# Patient Record
Sex: MALE | Race: BLACK OR AFRICAN AMERICAN | URBAN - METROPOLITAN AREA
[De-identification: names, ages, dates, MRNs, and addresses within clinical notes are randomized per-mention and may not be internally consistent; named-entity substitution may affect disease eponyms.]

---

## 2022-06-13 ENCOUNTER — OFFICE VISIT (OUTPATIENT)
Dept: FAMILY MEDICINE CLINIC | Age: 57
End: 2022-06-13
Payer: COMMERCIAL

## 2022-06-13 VITALS
TEMPERATURE: 99.6 F | HEART RATE: 76 BPM | SYSTOLIC BLOOD PRESSURE: 138 MMHG | HEIGHT: 66 IN | WEIGHT: 171 LBS | DIASTOLIC BLOOD PRESSURE: 80 MMHG | BODY MASS INDEX: 27.48 KG/M2 | OXYGEN SATURATION: 99 % | RESPIRATION RATE: 18 BRPM

## 2022-06-13 DIAGNOSIS — U07.1 SARS-COV-2 POSITIVE: ICD-10-CM

## 2022-06-13 DIAGNOSIS — B34.9 VIRAL ILLNESS: Primary | ICD-10-CM

## 2022-06-13 LAB
INFLUENZA A ANTIBODY: NEGATIVE
INFLUENZA B ANTIBODY: NEGATIVE
Lab: ABNORMAL
PERFORMING INSTRUMENT: ABNORMAL
QC PASS/FAIL: ABNORMAL
SARS-COV-2, POC: DETECTED

## 2022-06-13 PROCEDURE — 87426 SARSCOV CORONAVIRUS AG IA: CPT | Performed by: PHYSICIAN ASSISTANT

## 2022-06-13 PROCEDURE — 99213 OFFICE O/P EST LOW 20 MIN: CPT | Performed by: PHYSICIAN ASSISTANT

## 2022-06-13 PROCEDURE — 87804 INFLUENZA ASSAY W/OPTIC: CPT | Performed by: PHYSICIAN ASSISTANT

## 2022-06-13 RX ORDER — IBUPROFEN 800 MG/1
800 TABLET ORAL EVERY 8 HOURS PRN
Qty: 30 TABLET | Refills: 1 | Status: SHIPPED | OUTPATIENT
Start: 2022-06-13 | End: 2022-06-18

## 2022-06-13 SDOH — SOCIAL STABILITY: SOCIAL INSECURITY
WITHIN THE LAST YEAR, HAVE TO BEEN RAPED OR FORCED TO HAVE ANY KIND OF SEXUAL ACTIVITY BY YOUR PARTNER OR EX-PARTNER?: PATIENT DECLINED

## 2022-06-13 SDOH — HEALTH STABILITY: MENTAL HEALTH
STRESS IS WHEN SOMEONE FEELS TENSE, NERVOUS, ANXIOUS, OR CAN'T SLEEP AT NIGHT BECAUSE THEIR MIND IS TROUBLED. HOW STRESSED ARE YOU?: PATIENT DECLINED

## 2022-06-13 SDOH — SOCIAL STABILITY: SOCIAL NETWORK: HOW OFTEN DO YOU GET TOGETHER WITH FRIENDS OR RELATIVES?: PATIENT DECLINED

## 2022-06-13 SDOH — SOCIAL STABILITY: SOCIAL NETWORK: IN A TYPICAL WEEK, HOW MANY TIMES DO YOU TALK ON THE PHONE WITH FAMILY, FRIENDS, OR NEIGHBORS?: PATIENT DECLINED

## 2022-06-13 SDOH — SOCIAL STABILITY: SOCIAL NETWORK: HOW OFTEN DO YOU ATTENT MEETINGS OF THE CLUB OR ORGANIZATION YOU BELONG TO?: PATIENT DECLINED

## 2022-06-13 SDOH — SOCIAL STABILITY: SOCIAL INSECURITY
WITHIN THE LAST YEAR, HAVE YOU BEEN KICKED, HIT, SLAPPED, OR OTHERWISE PHYSICALLY HURT BY YOUR PARTNER OR EX-PARTNER?: PATIENT DECLINED

## 2022-06-13 SDOH — SOCIAL STABILITY: SOCIAL INSECURITY: WITHIN THE LAST YEAR, HAVE YOU BEEN AFRAID OF YOUR PARTNER OR EX-PARTNER?: PATIENT DECLINED

## 2022-06-13 SDOH — HEALTH STABILITY: PHYSICAL HEALTH
ON AVERAGE, HOW MANY DAYS PER WEEK DO YOU ENGAGE IN MODERATE TO STRENUOUS EXERCISE (LIKE A BRISK WALK)?: PATIENT DECLINED

## 2022-06-13 SDOH — ECONOMIC STABILITY: FOOD INSECURITY: WITHIN THE PAST 12 MONTHS, YOU WORRIED THAT YOUR FOOD WOULD RUN OUT BEFORE YOU GOT MONEY TO BUY MORE.: PATIENT DECLINED

## 2022-06-13 SDOH — HEALTH STABILITY: PHYSICAL HEALTH: ON AVERAGE, HOW MANY MINUTES DO YOU ENGAGE IN EXERCISE AT THIS LEVEL?: PATIENT DECLINED

## 2022-06-13 SDOH — SOCIAL STABILITY: SOCIAL NETWORK: HOW OFTEN DO YOU ATTEND CHURCH OR RELIGIOUS SERVICES?: PATIENT DECLINED

## 2022-06-13 SDOH — ECONOMIC STABILITY: TRANSPORTATION INSECURITY
IN THE PAST 12 MONTHS, HAS THE LACK OF TRANSPORTATION KEPT YOU FROM MEDICAL APPOINTMENTS OR FROM GETTING MEDICATIONS?: PATIENT DECLINED

## 2022-06-13 SDOH — SOCIAL STABILITY: SOCIAL NETWORK: ARE YOU MARRIED, WIDOWED, DIVORCED, SEPARATED, NEVER MARRIED, OR LIVING WITH A PARTNER?: PATIENT DECLINED

## 2022-06-13 SDOH — SOCIAL STABILITY: SOCIAL NETWORK
DO YOU BELONG TO ANY CLUBS OR ORGANIZATIONS SUCH AS CHURCH GROUPS UNIONS, FRATERNAL OR ATHLETIC GROUPS, OR SCHOOL GROUPS?: PATIENT DECLINED

## 2022-06-13 SDOH — SOCIAL STABILITY: SOCIAL INSECURITY
WITHIN THE LAST YEAR, HAVE YOU BEEN HUMILIATED OR EMOTIONALLY ABUSED IN OTHER WAYS BY YOUR PARTNER OR EX-PARTNER?: PATIENT DECLINED

## 2022-06-13 SDOH — ECONOMIC STABILITY: FOOD INSECURITY: WITHIN THE PAST 12 MONTHS, THE FOOD YOU BOUGHT JUST DIDN'T LAST AND YOU DIDN'T HAVE MONEY TO GET MORE.: PATIENT DECLINED

## 2022-06-13 SDOH — ECONOMIC STABILITY: INCOME INSECURITY: HOW HARD IS IT FOR YOU TO PAY FOR THE VERY BASICS LIKE FOOD, HOUSING, MEDICAL CARE, AND HEATING?: PATIENT DECLINED

## 2022-06-13 SDOH — ECONOMIC STABILITY: TRANSPORTATION INSECURITY
IN THE PAST 12 MONTHS, HAS LACK OF TRANSPORTATION KEPT YOU FROM MEETINGS, WORK, OR FROM GETTING THINGS NEEDED FOR DAILY LIVING?: PATIENT DECLINED

## 2022-06-13 SDOH — ECONOMIC STABILITY: INCOME INSECURITY: IN THE LAST 12 MONTHS, WAS THERE A TIME WHEN YOU WERE NOT ABLE TO PAY THE MORTGAGE OR RENT ON TIME?: PATIENT REFUSED

## 2022-06-13 SDOH — HEALTH STABILITY: MENTAL HEALTH: HOW OFTEN DO YOU HAVE A DRINK CONTAINING ALCOHOL?: PATIENT DECLINED

## 2022-06-13 SDOH — ECONOMIC STABILITY: HOUSING INSECURITY
IN THE LAST 12 MONTHS, WAS THERE A TIME WHEN YOU DID NOT HAVE A STEADY PLACE TO SLEEP OR SLEPT IN A SHELTER (INCLUDING NOW)?: PATIENT REFUSED

## 2022-06-13 SDOH — HEALTH STABILITY: MENTAL HEALTH: HOW MANY STANDARD DRINKS CONTAINING ALCOHOL DO YOU HAVE ON A TYPICAL DAY?: PATIENT DECLINED

## 2022-06-13 ASSESSMENT — PATIENT HEALTH QUESTIONNAIRE - PHQ9
SUM OF ALL RESPONSES TO PHQ9 QUESTIONS 1 & 2: 0
SUM OF ALL RESPONSES TO PHQ QUESTIONS 1-9: 0
1. LITTLE INTEREST OR PLEASURE IN DOING THINGS: 0
SUM OF ALL RESPONSES TO PHQ QUESTIONS 1-9: 0
2. FEELING DOWN, DEPRESSED OR HOPELESS: 0

## 2022-06-13 ASSESSMENT — ENCOUNTER SYMPTOMS
NAUSEA: 0
COUGH: 1
WHEEZING: 0
VOMITING: 0
CHEST TIGHTNESS: 0
SINUS PAIN: 0
BACK PAIN: 0
DIARRHEA: 0
ABDOMINAL PAIN: 1
SINUS PRESSURE: 0
SHORTNESS OF BREATH: 0
SORE THROAT: 1

## 2022-06-13 ASSESSMENT — VISUAL ACUITY: OU: 1

## 2022-06-13 NOTE — PROGRESS NOTES
[address] Encounter  CHIEF COMPLAINT       Chief Complaint   Patient presents with    Fever     patient started to feel sick about 2 days ago.  Headache    Generalized Body Aches    Pharyngitis       HISTORY OF PRESENT ILLNESS   Leti Marcelino is a 61 y.o. male who presents with:  Fever   This is a new problem. The current episode started yesterday. The problem occurs constantly. The problem has been gradually worsening. The maximum temperature noted was 100 to 100.9 F. The temperature was taken using an oral thermometer. Associated symptoms include abdominal pain, congestion, coughing, headaches, muscle aches, sleepiness and a sore throat. Pertinent negatives include no chest pain, diarrhea, ear pain, nausea, rash, urinary pain, vomiting or wheezing. He has tried acetaminophen and NSAIDs for the symptoms. The treatment provided mild relief. Risk factors: sick contacts    Risk factors: no contaminated food, no contaminated water, no hx of cancer, no immunosuppression, no occupational exposure, no recent sickness and no recent travel         REVIEW OF SYSTEMS     Review of Systems   Constitutional: Positive for fever. Negative for activity change, appetite change and chills. HENT: Positive for congestion and sore throat. Negative for drooling, ear pain, sinus pressure and sinus pain. Eyes: Negative for visual disturbance. Respiratory: Positive for cough. Negative for chest tightness, shortness of breath and wheezing. Cardiovascular: Negative for chest pain. Gastrointestinal: Positive for abdominal pain. Negative for diarrhea, nausea and vomiting. Endocrine: Negative for cold intolerance. Genitourinary: Negative for dysuria, flank pain, frequency and hematuria. Musculoskeletal: Negative for arthralgias and back pain. Skin: Negative for rash. Allergic/Immunologic: Negative for food allergies. Neurological: Positive for headaches.  Negative for weakness, light-headedness and numbness. Hematological: Does not bruise/bleed easily. PAST MEDICAL HISTORY         Diagnosis Date    Hyperlipidemia      SURGICAL HISTORY     Patient  has no past surgical history on file. CURRENT MEDICATIONS       Previous Medications    FLUTICASONE (FLONASE) 50 MCG/ACT NASAL SPRAY    USE 2 SPRAYS IN EACH NOSTRIL DAILY    TAMSULOSIN (FLOMAX) 0.4 MG CAPSULE    TAKE 1 CAPSULE BY MOUTH DAILY     ALLERGIES     Patient is is allergic to no known drug allergy. FAMILY HISTORY     Patient'sfamily history includes Alzheimer's Disease in his father; Asthma in his none; Cancer in his none; Cirrhosis in his mother; Coronary Art Dis in his maternal grandmother; Diabetes in his maternal grandmother; Hypertension in his father. SOCIAL HISTORY     Patient  reports that he has never smoked. He has never used smokeless tobacco. He reports that he does not use drugs. PHYSICAL EXAM     VITALS  BP: 138/80, Temp: 99.6 °F (37.6 °C), Heart Rate: 76, Resp: 18, SpO2: 99 %  Physical Exam  Vitals and nursing note reviewed. Constitutional:       General: He is awake. He is not in acute distress. Appearance: Normal appearance. He is well-developed. He is not ill-appearing, toxic-appearing or diaphoretic. HENT:      Head: Normocephalic and atraumatic. Right Ear: Hearing, tympanic membrane and external ear normal.      Left Ear: Hearing, tympanic membrane, ear canal and external ear normal.      Nose: Congestion present. Eyes:      General: Lids are normal. Vision grossly intact. Gaze aligned appropriately. Conjunctiva/sclera: Conjunctivae normal.   Cardiovascular:      Rate and Rhythm: Normal rate and regular rhythm. Pulses: Normal pulses. Heart sounds: Normal heart sounds, S1 normal and S2 normal.   Pulmonary:      Effort: Pulmonary effort is normal.      Breath sounds: Normal breath sounds and air entry. Musculoskeletal:      Cervical back: Normal range of motion. Skin:     General: Skin is warm. Capillary Refill: Capillary refill takes less than 2 seconds. Neurological:      Mental Status: He is alert and oriented to person, place, and time. Gait: Gait is intact. Psychiatric:         Attention and Perception: Attention normal.         Mood and Affect: Mood normal.         Speech: Speech normal.         Behavior: Behavior normal. Behavior is cooperative. READY CARE COURSE   Labs:  Results for POC orders placed in visit on 06/13/22   POCT Influenza A/B   Result Value Ref Range    Influenza A Ab negative     Influenza B Ab negative      IMAGING:  No orders to display     Scheduled Meds:  Continuous Infusions:  PRN Meds:. PROCEDURES:  FINAL IMPRESSION      1. Viral illness    2. SARS-CoV-2 positive      DISPOSITION/PLAN   1,2. POCT COVID-19 postive. Due to patient's age and symptoms recommend starting patient on Molnupiravir 800 mg BID for 5 days. Continue supportive treatment. Patient is to quarantine till 06/18/22 or may return to work on 06/23/22. Went over possible s/e of the medications. Patient would like to proceed with therapy. Discussed signs and symptoms which require immediate follow-up in ED/call to 911. Patient verbalized understanding. On this date 6/13/2022 I have spent 20 minutes reviewing previous notes, test results and face to face with the patient discussing the diagnosis and importance of compliance with the treatment plan as well as documenting on the day of the visit. PATIENT REFERRED TO:  Return if symptoms worsen or fail to improve. DISCHARGE MEDICATIONS:  New Prescriptions    IBUPROFEN (ADVIL;MOTRIN) 800 MG TABLET    Take 1 tablet by mouth every 8 hours as needed for Pain    MOLNUPIRAVIR 200 MG CAPSULE    Take 4 capsules by mouth every 12 hours for 5 days     Cannot display discharge medications since this is not an admission.        Neeraj Garrido

## 2022-06-13 NOTE — LETTER
[facility]  NOTIFICATION RETURN TO WORK / SCHOOL  [address] [facility] [address] [facility] [address]  Phone: [phone]  Fax: [phone]    June 13, 2022     Patient: Aditya Stovall   Date of Visit: 6/13/2022     To Whom it May Concern:    Aditya Stovall was tested for COVID-19 on 6/13/2022 and the result was positive. Patient's symptoms first started on June 12. Due to new CDC Guidelines, Patient will need to quarantine for 5 days from symptom onset. If the patient is asymptomatic or if symptoms are resolving and no fevers for 24 hours, without fever reducing medication such as Tylenol or Motrin, then patient is able to return to work on June 18 with strict mask policy for 5 additional days to minimize risk of infecting people they encounter. Otherwise, patient may return to work if fever free for 24 hours on on 6/23. Please refer to the [facility] website for further information or clarification. Component      Latest Ref Rng & Units 6/13/2022          10:06 AM   SARS-COV-2, POC      Not Detected Detected (A)   Lot Number       [id number]   QC Pass/Fail       pass   Performing Instrument       BD Veritor     If there are questions or concerns, please have the patient contact our office.     Sincerely,    Electronically signed by SAMI Lindquist on 6/13/2022 at 10:43 AM

## 2023-05-09 ENCOUNTER — OFFICE VISIT (OUTPATIENT)
Dept: FAMILY MEDICINE CLINIC | Age: 58
End: 2023-05-09
Payer: COMMERCIAL

## 2023-05-09 VITALS
OXYGEN SATURATION: 98 % | SYSTOLIC BLOOD PRESSURE: 120 MMHG | WEIGHT: 172 LBS | BODY MASS INDEX: 27.64 KG/M2 | HEART RATE: 86 BPM | HEIGHT: 66 IN | DIASTOLIC BLOOD PRESSURE: 88 MMHG

## 2023-05-09 DIAGNOSIS — K21.9 GASTROESOPHAGEAL REFLUX DISEASE, UNSPECIFIED WHETHER ESOPHAGITIS PRESENT: Primary | ICD-10-CM

## 2023-05-09 PROCEDURE — 99202 OFFICE O/P NEW SF 15 MIN: CPT | Performed by: NURSE PRACTITIONER

## 2023-05-09 RX ORDER — PANTOPRAZOLE SODIUM 40 MG/1
40 TABLET, DELAYED RELEASE ORAL
Qty: 30 TABLET | Refills: 0 | Status: SHIPPED | OUTPATIENT
Start: 2023-05-09

## 2023-05-09 ASSESSMENT — ENCOUNTER SYMPTOMS
ABDOMINAL PAIN: 1
NAUSEA: 1
COUGH: 0
EYE DISCHARGE: 0
EYE PAIN: 0
WHEEZING: 0
PHOTOPHOBIA: 0
VOMITING: 0
EYE ITCHING: 0
RHINORRHEA: 0
CONSTIPATION: 0
SHORTNESS OF BREATH: 0
EYE REDNESS: 0
CHEST TIGHTNESS: 0
DIARRHEA: 0
SORE THROAT: 0
SINUS PRESSURE: 0
TROUBLE SWALLOWING: 0
SINUS PAIN: 0

## 2023-05-09 ASSESSMENT — PATIENT HEALTH QUESTIONNAIRE - PHQ9
1. LITTLE INTEREST OR PLEASURE IN DOING THINGS: 0
SUM OF ALL RESPONSES TO PHQ9 QUESTIONS 1 & 2: 0
SUM OF ALL RESPONSES TO PHQ QUESTIONS 1-9: 0
2. FEELING DOWN, DEPRESSED OR HOPELESS: 0

## 2023-05-09 ASSESSMENT — VISUAL ACUITY: OU: 1

## 2023-05-09 NOTE — PROGRESS NOTES
Subjective:      Patient ID: Yamilet Locke is a 61 y.o. male who present today with:      Chief Complaint   Patient presents with    Nausea     Heart burn , stomach pain , headaches , fatigue, hard to sleep muscle pain, start 1 month      Symptoms for the last month  Workplace stress  He is retired but had to take another job to afford insurance for himself and his wife    Feels like a knot in his stomach  Heartburn  Headaches  Fatigue  Sleeping trouble  Body aches  He does not like to take medication if he can help it  Stomach problem not going away  Tried pepto bismol  No vomiting    Past Medical History:   Diagnosis Date    Hyperlipidemia      Patient Active Problem List    Diagnosis Date Noted    Hyperlipidemia 09/25/2013    Numbness and tingling sensation of skin 10/26/2009     No past surgical history on file.   Social History     Socioeconomic History    Marital status:      Spouse name: None    Number of children: None    Years of education: None    Highest education level: None   Tobacco Use    Smoking status: Never    Smokeless tobacco: Never    Tobacco comments:     Quit smoking: 10/12/1996   Substance and Sexual Activity    Drug use: No     Social Determinants of Health     Financial Resource Strain: Unknown    Difficulty of Paying Living Expenses: Patient refused   Food Insecurity: Unknown    Worried About Running Out of Food in the Last Year: Patient refused    Ran Out of Food in the Last Year: Patient refused   Transportation Needs: Unknown    Lack of Transportation (Medical): Patient refused    Lack of Transportation (Non-Medical): Patient refused   Physical Activity: Unknown    Days of Exercise per Week: Patient refused    Minutes of Exercise per Session: Patient refused   Stress: Unknown    Feeling of Stress : Patient refused   Social Connections: Unknown    Frequency of Communication with Friends and Family: Patient refused    Frequency of Social Gatherings with Friends and Family: Patient

## 2023-05-16 ENCOUNTER — OFFICE VISIT (OUTPATIENT)
Dept: FAMILY MEDICINE CLINIC | Age: 58
End: 2023-05-16
Payer: COMMERCIAL

## 2023-05-16 VITALS
DIASTOLIC BLOOD PRESSURE: 80 MMHG | HEIGHT: 66 IN | SYSTOLIC BLOOD PRESSURE: 134 MMHG | WEIGHT: 171 LBS | OXYGEN SATURATION: 97 % | HEART RATE: 65 BPM | TEMPERATURE: 97.8 F | BODY MASS INDEX: 27.48 KG/M2

## 2023-05-16 DIAGNOSIS — Z13.220 LIPID SCREENING: ICD-10-CM

## 2023-05-16 DIAGNOSIS — D17.1 LIPOMA OF TORSO: ICD-10-CM

## 2023-05-16 DIAGNOSIS — R10.9 ABDOMINAL PAIN, UNSPECIFIED ABDOMINAL LOCATION: ICD-10-CM

## 2023-05-16 DIAGNOSIS — Z12.11 COLON CANCER SCREENING: ICD-10-CM

## 2023-05-16 DIAGNOSIS — R10.9 ABDOMINAL PAIN, UNSPECIFIED ABDOMINAL LOCATION: Primary | ICD-10-CM

## 2023-05-16 LAB
ALBUMIN SERPL-MCNC: 4.3 G/DL (ref 3.5–4.6)
ALP SERPL-CCNC: 70 U/L (ref 35–104)
ALT SERPL-CCNC: 15 U/L (ref 0–41)
ANION GAP SERPL CALCULATED.3IONS-SCNC: 12 MEQ/L (ref 9–15)
AST SERPL-CCNC: 24 U/L (ref 0–40)
BASOPHILS # BLD: 0 K/UL (ref 0–0.2)
BASOPHILS NFR BLD: 0.8 %
BILIRUB SERPL-MCNC: <0.2 MG/DL (ref 0.2–0.7)
BUN SERPL-MCNC: 13 MG/DL (ref 6–20)
CALCIUM SERPL-MCNC: 9.6 MG/DL (ref 8.5–9.9)
CHLORIDE SERPL-SCNC: 103 MEQ/L (ref 95–107)
CHOLEST SERPL-MCNC: 298 MG/DL (ref 0–199)
CO2 SERPL-SCNC: 24 MEQ/L (ref 20–31)
CREAT SERPL-MCNC: 0.98 MG/DL (ref 0.7–1.2)
EOSINOPHIL # BLD: 0.1 K/UL (ref 0–0.7)
EOSINOPHIL NFR BLD: 1.4 %
ERYTHROCYTE [DISTWIDTH] IN BLOOD BY AUTOMATED COUNT: 13.5 % (ref 11.5–14.5)
GLOBULIN SER CALC-MCNC: 3.4 G/DL (ref 2.3–3.5)
GLUCOSE SERPL-MCNC: 104 MG/DL (ref 70–99)
HCT VFR BLD AUTO: 39.8 % (ref 42–52)
HDLC SERPL-MCNC: 59 MG/DL (ref 40–59)
HGB BLD-MCNC: 13.5 G/DL (ref 14–18)
LDLC SERPL CALC-MCNC: 224 MG/DL (ref 0–129)
LIPASE SERPL-CCNC: 147 U/L (ref 12–95)
LYMPHOCYTES # BLD: 2.8 K/UL (ref 1–4.8)
LYMPHOCYTES NFR BLD: 49 %
MCH RBC QN AUTO: 30.3 PG (ref 27–31.3)
MCHC RBC AUTO-ENTMCNC: 33.9 % (ref 33–37)
MCV RBC AUTO: 89.3 FL (ref 79–92.2)
MONOCYTES # BLD: 0.3 K/UL (ref 0.2–0.8)
MONOCYTES NFR BLD: 5.7 %
NEUTROPHILS # BLD: 2.4 K/UL (ref 1.4–6.5)
NEUTS SEG NFR BLD: 43.1 %
PLATELET # BLD AUTO: 355 K/UL (ref 130–400)
POTASSIUM SERPL-SCNC: 4.1 MEQ/L (ref 3.4–4.9)
PROT SERPL-MCNC: 7.7 G/DL (ref 6.3–8)
RBC # BLD AUTO: 4.46 M/UL (ref 4.7–6.1)
SODIUM SERPL-SCNC: 139 MEQ/L (ref 135–144)
TRIGL SERPL-MCNC: 77 MG/DL (ref 0–150)
WBC # BLD AUTO: 5.6 K/UL (ref 4.8–10.8)

## 2023-05-16 PROCEDURE — 99214 OFFICE O/P EST MOD 30 MIN: CPT | Performed by: NURSE PRACTITIONER

## 2023-05-16 ASSESSMENT — ENCOUNTER SYMPTOMS
SHORTNESS OF BREATH: 0
ABDOMINAL PAIN: 1
DIARRHEA: 1
COUGH: 0
CONSTIPATION: 0

## 2023-05-16 NOTE — PROGRESS NOTES
Standing Expiration Date:   5/15/2024    Lipase     Standing Status:   Future     Standing Expiration Date:   5/16/2024    Bebe Doyle MD, General Surgery, [facility]     Referral Priority:   Routine     Referral Type:   Eval and Treat     Referral Reason:   Specialty Services Required     Requested Specialty:   Surgical Critical Care     Number of Visits Requested:   1       No orders of the defined types were placed in this encounter. Medications Discontinued During This Encounter   Medication Reason    fluticasone (FLONASE) 50 MCG/ACT nasal spray ERROR    ibuprofen (ADVIL;MOTRIN) 800 MG tablet ERROR    tamsulosin (FLOMAX) 0.4 MG capsule ERROR      Side effects, adverse effects of the medication prescribed today, as well as treatment plan/ rationale and result expectations have been discussed with the patient who expresses understanding and desires to proceed. Close follow up to evaluate treatment results and for coordination of care. I have reviewed the patient's medical history in detail and updated the computerized patient record. As always, patient is advised that if symptoms worsen in any way they will proceed to the nearest emergency room.      FU 1-2 weeks       ALE Bansal - CNP

## 2023-05-17 ENCOUNTER — TELEPHONE (OUTPATIENT)
Dept: FAMILY MEDICINE CLINIC | Age: 58
End: 2023-05-17

## 2023-05-17 NOTE — TELEPHONE ENCOUNTER
PT calling requesting a full body CT scan , wants to get it done and over with instead of going multiple times due to the radiation

## 2023-05-18 DIAGNOSIS — R74.8 ELEVATED PANCREATIC ENZYME: ICD-10-CM

## 2023-05-18 DIAGNOSIS — R10.84 GENERALIZED ABDOMINAL PAIN: Primary | ICD-10-CM

## 2023-05-18 NOTE — TELEPHONE ENCOUNTER
Pt calling back asking for PET Scan .   Asking for this to be done instead of a CT     Pt [phone]

## 2023-05-19 NOTE — TELEPHONE ENCOUNTER
Spoke to patient   Aware sunil can not order the imaging his is requesting due to medical necessity. Will discuss further at follow up visit.

## 2023-05-22 NOTE — PROGRESS NOTES
GENERAL SURGERY  NEW PATIENT HISTORY AND PHYSICAL NOTE    Pt Name: Laura Anderson  MRN: [id number]    Date: 2023    Primary Care Physician: ALE Benjamin CNP  Referring Provider: Nancy Evans NP    Reason for evaluation: Right back mass      SUBJECTIVE:     History of Chief Complaint:    Edelmira Estrada is a 61 y.o. male with a PMH of HLD and GERD who presents with an upper right back mass. He reports having the mass since he was a kid. Notes the mass is getting bigger and causing some constant dull pain, 5/10. He does not take any pain meds for it. 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 Past Medical History:   Diagnosis Date    GERD (gastroesophageal reflux disease)     Hyperlipidemia      No past surgical history on file. Prior to Admission medications    Medication Sig Start Date End Date Taking?  Authorizing Provider   pantoprazole (PROTONIX) 40 MG tablet Take 1 tablet by mouth every morning (before breakfast) 23   ALE Rivas CNP     No Known Allergies  Family History   Problem Relation Age of Onset    Alzheimer's Disease Father     Hypertension Father             Cirrhosis Mother             Cancer None     Asthma None     Coronary Art Dis Maternal Grandmother     Diabetes Maternal Grandmother      Social History     Tobacco Use    Smoking status: Never    Smokeless tobacco: Never    Tobacco comments:     Quit smoking: 10/12/1996   Substance Use Topics    Drug use: No     Review of Systems:   General: Denies any fevers, chills, night sweats, and unintentional weight loss +appetite changes, fatigue  Skin: Denies any itching, rash, abscess +back bulges  HENT: Denies any blurry vision, sinus pressure, post nasal drip, rhinorrhea, sore throat, dysphagia +headaches  Resp: Denies any shortness of breath, dyspnea on exertion, coughing, wheezing  Cardiac: Denies any chest pain, palpitations, lower extremity edema  GI: Denies any vomiting,

## 2023-05-23 ENCOUNTER — OFFICE VISIT (OUTPATIENT)
Dept: FAMILY MEDICINE CLINIC | Age: 58
End: 2023-05-23
Payer: COMMERCIAL

## 2023-05-23 ENCOUNTER — PREP FOR PROCEDURE (OUTPATIENT)
Dept: SURGERY | Age: 58
End: 2023-05-23

## 2023-05-23 ENCOUNTER — OFFICE VISIT (OUTPATIENT)
Dept: SURGERY | Age: 58
End: 2023-05-23
Payer: COMMERCIAL

## 2023-05-23 VITALS
BODY MASS INDEX: 27.32 KG/M2 | WEIGHT: 170 LBS | HEART RATE: 56 BPM | SYSTOLIC BLOOD PRESSURE: 110 MMHG | OXYGEN SATURATION: 97 % | HEIGHT: 66 IN | DIASTOLIC BLOOD PRESSURE: 70 MMHG

## 2023-05-23 VITALS
HEART RATE: 64 BPM | DIASTOLIC BLOOD PRESSURE: 74 MMHG | HEIGHT: 66 IN | OXYGEN SATURATION: 99 % | SYSTOLIC BLOOD PRESSURE: 118 MMHG | WEIGHT: 173 LBS | BODY MASS INDEX: 27.8 KG/M2 | TEMPERATURE: 98.2 F

## 2023-05-23 DIAGNOSIS — R10.9 ABDOMINAL PAIN, UNSPECIFIED ABDOMINAL LOCATION: ICD-10-CM

## 2023-05-23 DIAGNOSIS — D64.9 ANEMIA, UNSPECIFIED TYPE: ICD-10-CM

## 2023-05-23 DIAGNOSIS — R22.2 MASS ON BACK: Primary | ICD-10-CM

## 2023-05-23 DIAGNOSIS — R74.8 ELEVATED LIPASE: Primary | ICD-10-CM

## 2023-05-23 DIAGNOSIS — E78.5 HYPERLIPIDEMIA, UNSPECIFIED HYPERLIPIDEMIA TYPE: Chronic | ICD-10-CM

## 2023-05-23 PROCEDURE — 99203 OFFICE O/P NEW LOW 30 MIN: CPT | Performed by: SURGERY

## 2023-05-23 PROCEDURE — 99213 OFFICE O/P EST LOW 20 MIN: CPT | Performed by: NURSE PRACTITIONER

## 2023-05-23 RX ORDER — SODIUM CHLORIDE 0.9 % (FLUSH) 0.9 %
5-40 SYRINGE (ML) INJECTION PRN
OUTPATIENT
Start: 2023-05-23

## 2023-05-23 RX ORDER — SODIUM CHLORIDE 9 MG/ML
INJECTION, SOLUTION INTRAVENOUS PRN
OUTPATIENT
Start: 2023-05-23

## 2023-05-23 RX ORDER — SODIUM CHLORIDE 0.9 % (FLUSH) 0.9 %
5-40 SYRINGE (ML) INJECTION EVERY 12 HOURS SCHEDULED
OUTPATIENT
Start: 2023-05-23

## 2023-05-23 ASSESSMENT — ENCOUNTER SYMPTOMS
ABDOMINAL PAIN: 1
SHORTNESS OF BREATH: 0
COUGH: 0
ABDOMINAL DISTENTION: 1

## 2023-05-23 NOTE — PROGRESS NOTES
Subjective  Chief Complaint   Patient presents with    Abdominal Pain     1 week follow up          HPI    Pt is here for fu of recent BW.   Mild anemia, elevated lipase and elevated cholesterol found. CT Of abdomen ordered. Not done yet. Declining cholesterol med. Wants \"full body scan/PET scan\"     Lab Results   Component Value Date    WBC 5.6 2023    HGB 13.5 (L) 2023    HCT 39.8 (L) 2023     2023    CHOL 298 (H) 2023    TRIG 77 2023    HDL 59 2023    LDLDIRECT 180 (H) 2013    ALT 15 2023    AST 24 2023     2023    K 4.1 2023     2023    CREATININE 0.98 2023    BUN 13 2023    CO2 24 2023    PSA 0.56 2015       Patient Active Problem List    Diagnosis Date Noted    Mass of skin of back 2023    Hyperlipidemia 2013    Numbness and tingling sensation of skin 10/26/2009     Past Medical History:   Diagnosis Date    GERD (gastroesophageal reflux disease)     Hyperlipidemia      No past surgical history on file.   Family History   Problem Relation Age of Onset    Alzheimer's Disease Father     Hypertension Father             Cirrhosis Mother             Cancer None     Asthma None     Coronary Art Dis Maternal Grandmother     Diabetes Maternal Grandmother      Social History     Socioeconomic History    Marital status:      Spouse name: None    Number of children: None    Years of education: None    Highest education level: None   Tobacco Use    Smoking status: Never    Smokeless tobacco: Never    Tobacco comments:     Quit smoking: 10/12/1996   Substance and Sexual Activity    Drug use: No     Social Determinants of Health     Financial Resource Strain: Unknown    Difficulty of Paying Living Expenses: Patient refused   Food Insecurity: Unknown    Worried About Running Out of Food in the Last Year: Patient refused    [address] in the Last Year: Patient refused

## 2023-05-24 LAB
FOLATE: 8.2 NG/ML
IRON SATURATION: 19 % (ref 20–55)
IRON: 52 UG/DL (ref 59–158)
TOTAL IRON BINDING CAPACITY: 280 UG/DL (ref 250–450)
UNSATURATED IRON BINDING CAPACITY: 228 UG/DL (ref 112–347)
VITAMIN B-12: 715 PG/ML (ref 232–1245)

## 2023-06-08 ENCOUNTER — HOSPITAL ENCOUNTER (OUTPATIENT)
Dept: CT IMAGING | Age: 58
Discharge: HOME | End: 2023-06-10
Payer: COMMERCIAL

## 2023-06-08 DIAGNOSIS — R10.84 GENERALIZED ABDOMINAL PAIN: ICD-10-CM

## 2023-06-08 DIAGNOSIS — R74.8 ELEVATED PANCREATIC ENZYME: ICD-10-CM

## 2023-06-08 PROCEDURE — 6360000004 HC RX CONTRAST MEDICATION: Performed by: NURSE PRACTITIONER

## 2023-06-08 PROCEDURE — 74177 CT ABD & PELVIS W/CONTRAST: CPT

## 2023-06-08 RX ADMIN — IOPAMIDOL 50 ML: 612 INJECTION, SOLUTION INTRAVENOUS at 11:17

## 2023-06-23 ENCOUNTER — OFFICE VISIT (OUTPATIENT)
Dept: SURGERY | Age: 58
End: 2023-06-23

## 2023-06-23 VITALS
BODY MASS INDEX: 27.71 KG/M2 | DIASTOLIC BLOOD PRESSURE: 80 MMHG | TEMPERATURE: 97.1 F | HEIGHT: 66 IN | WEIGHT: 172.4 LBS | HEART RATE: 78 BPM | SYSTOLIC BLOOD PRESSURE: 128 MMHG | OXYGEN SATURATION: 98 %

## 2023-06-23 DIAGNOSIS — Z09 POSTOP CHECK: Primary | ICD-10-CM

## 2023-06-23 PROCEDURE — 99024 POSTOP FOLLOW-UP VISIT: CPT | Performed by: SURGERY

## 2023-07-14 LAB — NONINV COLON CA DNA+OCC BLD SCRN STL QL: NORMAL

## 2023-08-08 ENCOUNTER — OFFICE VISIT (OUTPATIENT)
Dept: FAMILY MEDICINE CLINIC | Age: 58
End: 2023-08-08
Payer: COMMERCIAL

## 2023-08-08 VITALS
OXYGEN SATURATION: 97 % | WEIGHT: 169 LBS | HEIGHT: 66 IN | SYSTOLIC BLOOD PRESSURE: 140 MMHG | HEART RATE: 60 BPM | DIASTOLIC BLOOD PRESSURE: 80 MMHG | BODY MASS INDEX: 27.16 KG/M2 | TEMPERATURE: 97.9 F

## 2023-08-08 DIAGNOSIS — R09.81 NASAL CONGESTION: ICD-10-CM

## 2023-08-08 DIAGNOSIS — J02.9 SORE THROAT: ICD-10-CM

## 2023-08-08 DIAGNOSIS — J06.9 VIRAL URI: ICD-10-CM

## 2023-08-08 DIAGNOSIS — R19.7 DIARRHEA, UNSPECIFIED TYPE: Primary | ICD-10-CM

## 2023-08-08 LAB
INFLUENZA A ANTIBODY: NORMAL
INFLUENZA B ANTIBODY: NORMAL
Lab: NORMAL
PERFORMING INSTRUMENT: NORMAL
QC PASS/FAIL: NORMAL
SARS-COV-2, POC: NORMAL

## 2023-08-08 PROCEDURE — 99214 OFFICE O/P EST MOD 30 MIN: CPT | Performed by: NURSE PRACTITIONER

## 2023-08-08 PROCEDURE — 87426 SARSCOV CORONAVIRUS AG IA: CPT | Performed by: NURSE PRACTITIONER

## 2023-08-08 PROCEDURE — 87804 INFLUENZA ASSAY W/OPTIC: CPT | Performed by: NURSE PRACTITIONER

## 2023-08-08 RX ORDER — PENICILLIN V POTASSIUM 500 MG/1
500 TABLET ORAL 3 TIMES DAILY
Qty: 30 TABLET | Refills: 0 | Status: SHIPPED | OUTPATIENT
Start: 2023-08-08 | End: 2023-08-18

## 2023-08-08 ASSESSMENT — ENCOUNTER SYMPTOMS
DIARRHEA: 1
VOMITING: 0
SORE THROAT: 0
COUGH: 0
NAUSEA: 0
SHORTNESS OF BREATH: 0
ABDOMINAL PAIN: 0
RHINORRHEA: 1
WHEEZING: 0
BLOOD IN STOOL: 0

## 2023-08-08 NOTE — PROGRESS NOTES
Subjective:      Patient ID: Erika Finn is a 60 y.o. male who presents today for:  Chief Complaint   Patient presents with    Diarrhea     With fatigue headache loss of appetite body aches pains nasal drainage , all SX x  2-7 days           Diarrhea   Pertinent negatives include no abdominal pain, chills, coughing, fever, headaches, myalgias or vomiting.          Got sick about 7 days  This was mainly fatigue loss of appetite and diarrhea    He is improving now  Diarrhea 3-5 per day   So far once today   Some Heartburn, took something OTC and that helped   The congestion/runny nose is just starting   Appetite improving   Was never throwing up   Coughing last night but hasnt all week   Hes been able to go to work   Throat is kind of raw   Usually he takes PCN when  hes not feeling well but he doesn't have any of that and he needs some   No abd pain   No blood in the stool  Just watery   Past Medical History:   Diagnosis Date    GERD (gastroesophageal reflux disease)     Hyperlipidemia      Past Surgical History:   Procedure Laterality Date    SKIN LESION EXCISION Right 6/13/2023    EXCISION OF BACK MASS performed by Lindsay Kerr MD at [address] [place] [address] History     Socioeconomic History    Marital status:      Spouse name: Not on file    Number of children: Not on file    Years of education: Not on file    Highest education level: Not on file   Occupational History    Not on file   Tobacco Use    Smoking status: Never    Smokeless tobacco: Never    Tobacco comments:     Quit smoking: 10/12/1996   Vaping Use    Vaping Use: Never used   Substance and Sexual Activity    Alcohol use: Not Currently    Drug use: No    Sexual activity: Not on file   Other Topics Concern    Not on file   Social History Narrative    Not on file     Social Determinants of Health     Financial Resource Strain: Not on file   Food Insecurity: Not on file   Transportation Needs: Not on file   Physical Activity: Not on file   Stress:

## 2024-05-21 ENCOUNTER — TELEPHONE (OUTPATIENT)
Dept: FAMILY MEDICINE CLINIC | Age: 59
End: 2024-05-21

## 2024-05-21 NOTE — TELEPHONE ENCOUNTER
Pt calling asking about the bill of dos 8/8/23 for  $33.33. pt advised of what the eob from insurance company, pt is asking why the [employer] company did not pay all of the covid testing , advised that he would have to contact his insurance company. Also advised that the pt is being billed for two flu test a+B two different test. Pt is aware of all of this.

## 2024-06-15 ENCOUNTER — HOSPITAL ENCOUNTER (EMERGENCY)
Age: 59
Discharge: HOME | End: 2024-06-15
Payer: COMMERCIAL

## 2024-06-15 ENCOUNTER — APPOINTMENT (OUTPATIENT)
Dept: GENERAL RADIOLOGY | Age: 59
End: 2024-06-15
Payer: COMMERCIAL

## 2024-06-15 VITALS
TEMPERATURE: 97.5 F | HEART RATE: 63 BPM | BODY MASS INDEX: 28.12 KG/M2 | DIASTOLIC BLOOD PRESSURE: 93 MMHG | WEIGHT: 175 LBS | SYSTOLIC BLOOD PRESSURE: 167 MMHG | OXYGEN SATURATION: 99 % | RESPIRATION RATE: 18 BRPM | HEIGHT: 66 IN

## 2024-06-15 DIAGNOSIS — S46.912A STRAIN OF LEFT SHOULDER, INITIAL ENCOUNTER: Primary | ICD-10-CM

## 2024-06-15 PROCEDURE — 99283 EMERGENCY DEPT VISIT LOW MDM: CPT

## 2024-06-15 PROCEDURE — 73030 X-RAY EXAM OF SHOULDER: CPT

## 2024-06-15 PROCEDURE — 6370000000 HC RX 637 (ALT 250 FOR IP): Performed by: PERSONAL EMERGENCY RESPONSE ATTENDANT

## 2024-06-15 RX ORDER — IBUPROFEN 800 MG/1
800 TABLET ORAL ONCE
Status: COMPLETED | OUTPATIENT
Start: 2024-06-15 | End: 2024-06-15

## 2024-06-15 RX ORDER — METHOCARBAMOL 750 MG/1
750 TABLET, FILM COATED ORAL 4 TIMES DAILY
Qty: 20 TABLET | Refills: 0 | Status: SHIPPED | OUTPATIENT
Start: 2024-06-15 | End: 2024-06-20

## 2024-06-15 RX ADMIN — IBUPROFEN 800 MG: 800 TABLET, FILM COATED ORAL at 23:11

## 2024-06-15 ASSESSMENT — PAIN DESCRIPTION - DESCRIPTORS: DESCRIPTORS: THROBBING

## 2024-06-15 ASSESSMENT — PAIN DESCRIPTION - PAIN TYPE: TYPE: ACUTE PAIN

## 2024-06-15 ASSESSMENT — ENCOUNTER SYMPTOMS
COUGH: 0
VOMITING: 0
COLOR CHANGE: 0
BLOOD IN STOOL: 0
SORE THROAT: 0
SHORTNESS OF BREATH: 0
ABDOMINAL PAIN: 0
RHINORRHEA: 0
NAUSEA: 0
DIARRHEA: 0

## 2024-06-15 ASSESSMENT — PAIN SCALES - GENERAL
PAINLEVEL_OUTOF10: 8
PAINLEVEL_OUTOF10: 8

## 2024-06-15 ASSESSMENT — PAIN - FUNCTIONAL ASSESSMENT: PAIN_FUNCTIONAL_ASSESSMENT: 0-10

## 2024-06-15 ASSESSMENT — LIFESTYLE VARIABLES
HOW OFTEN DO YOU HAVE A DRINK CONTAINING ALCOHOL: NEVER
HOW MANY STANDARD DRINKS CONTAINING ALCOHOL DO YOU HAVE ON A TYPICAL DAY: PATIENT DOES NOT DRINK

## 2024-06-15 ASSESSMENT — PAIN DESCRIPTION - ORIENTATION: ORIENTATION: LEFT

## 2024-06-15 ASSESSMENT — PAIN DESCRIPTION - LOCATION
LOCATION: SHOULDER;NECK
LOCATION: SHOULDER

## 2024-06-16 NOTE — ED PROVIDER NOTES
Behavior normal.         Thought Content: Thought content normal.         Judgment: Judgment normal.         DIAGNOSTIC RESULTS     EKG:All EKG's are interpreted by the Emergency Department Physician who either signs or Co-signs this chart in the absence of a cardiologist.        RADIOLOGY:   Non-plain film images such as CT, Ultrasound and MRI are read by theradiologist. Plain radiographic images are visualized and preliminarily interpreted by the emergency physician with the below findings:    Interpretation per theRadiologist below, if available at the time of this note:    XR SHOULDER LEFT (MIN 2 VIEWS)    (Results Pending)           LABS:  Labs Reviewed - No data to display    All other labs were within normal range or not returned as of this dictation.    EMERGENCY DEPARTMENT COURSE and DIFFERENTIAL DIAGNOSIS/MDM:   Vitals:    Vitals:    06/15/24 2150   BP: (!) 167/93   Pulse: 63   Resp: 18   Temp: 97.5 °F (36.4 °C)   TempSrc: Oral   SpO2: 99%   Weight: 79.4 kg (175 lb)   Height: 1.676 m (5' 6\")         MDM    Pt presents with left shoulder pain after pushing heavy equipment    Personal interpretation and visualization:  Left shoulder x-ray shows no acute fractures    Patient given Motrin    Patient aware to alternate Motrin Tylenol for pain and sent home with muscle relaxers.  Given orthopedic follow-up.  He appears nontoxic in no apparent distress.  Also aware to ice the area    Standard anticipatory guidance given to patient upon discharge. Have given them a specific time frame in which to follow-up and who to follow-up with. I have also advised them that they should return to the emergency department if they get worse, or not getting better or develop any new or concerning symptoms. Patient demonstrates understanding.        CRITICAL CARE TIME   Total Critical Caretime was 0 minutes, excluding separately reportable procedures.  There was a high probability of clinically significant/life threatening

## 2024-06-16 NOTE — ED TRIAGE NOTES
Patient to ER for left shoulder/neck pain after pushing heavy objects up a ramp at work. No radiating pain, no numbness. Decreased range of motion.

## 2024-11-21 ENCOUNTER — OFFICE VISIT (OUTPATIENT)
Dept: FAMILY MEDICINE CLINIC | Age: 59
End: 2024-11-21

## 2024-11-21 VITALS
HEIGHT: 65 IN | HEART RATE: 63 BPM | DIASTOLIC BLOOD PRESSURE: 84 MMHG | BODY MASS INDEX: 30.72 KG/M2 | OXYGEN SATURATION: 97 % | SYSTOLIC BLOOD PRESSURE: 136 MMHG | TEMPERATURE: 97.6 F | WEIGHT: 184.4 LBS

## 2024-11-21 DIAGNOSIS — Z12.11 COLON CANCER SCREENING: ICD-10-CM

## 2024-11-21 DIAGNOSIS — D64.9 ANEMIA, UNSPECIFIED TYPE: ICD-10-CM

## 2024-11-21 DIAGNOSIS — Z12.5 ENCOUNTER FOR SCREENING PROSTATE SPECIFIC ANTIGEN (PSA) MEASUREMENT: ICD-10-CM

## 2024-11-21 DIAGNOSIS — K21.9 GASTROESOPHAGEAL REFLUX DISEASE WITHOUT ESOPHAGITIS: Primary | ICD-10-CM

## 2024-11-21 DIAGNOSIS — R74.8 ELEVATED LIPASE: ICD-10-CM

## 2024-11-21 LAB
ALBUMIN SERPL-MCNC: 4.4 G/DL (ref 3.5–4.6)
ALP SERPL-CCNC: 79 U/L (ref 35–104)
ALT SERPL-CCNC: 16 U/L (ref 0–41)
ANION GAP SERPL CALCULATED.3IONS-SCNC: 11 MEQ/L (ref 9–15)
AST SERPL-CCNC: 21 U/L (ref 0–40)
BASOPHILS # BLD: 0 K/UL (ref 0–0.2)
BASOPHILS NFR BLD: 0.6 %
BILIRUB SERPL-MCNC: 0.4 MG/DL (ref 0.2–0.7)
BUN SERPL-MCNC: 15 MG/DL (ref 8–23)
CALCIUM SERPL-MCNC: 9.3 MG/DL (ref 8.5–9.9)
CHLORIDE SERPL-SCNC: 103 MEQ/L (ref 95–107)
CO2 SERPL-SCNC: 27 MEQ/L (ref 20–31)
CREAT SERPL-MCNC: 0.89 MG/DL (ref 0.7–1.2)
EOSINOPHIL # BLD: 0.1 K/UL (ref 0–0.7)
EOSINOPHIL NFR BLD: 1.2 %
ERYTHROCYTE [DISTWIDTH] IN BLOOD BY AUTOMATED COUNT: 13.2 % (ref 11.5–14.5)
GLOBULIN SER CALC-MCNC: 3.3 G/DL (ref 2.3–3.5)
GLUCOSE SERPL-MCNC: 100 MG/DL (ref 70–99)
HCT VFR BLD AUTO: 40.9 % (ref 42–52)
HGB BLD-MCNC: 13.5 G/DL (ref 14–18)
IRON % SATURATION: 34 % (ref 20–55)
IRON: 98 UG/DL (ref 61–157)
LIPASE SERPL-CCNC: 28 U/L (ref 12–95)
LYMPHOCYTES # BLD: 2.1 K/UL (ref 1–4.8)
LYMPHOCYTES NFR BLD: 40.2 %
MCH RBC QN AUTO: 29.2 PG (ref 27–31.3)
MCHC RBC AUTO-ENTMCNC: 33 % (ref 33–37)
MCV RBC AUTO: 88.3 FL (ref 79–92.2)
MONOCYTES # BLD: 0.3 K/UL (ref 0.2–0.8)
MONOCYTES NFR BLD: 6.7 %
NEUTROPHILS # BLD: 2.6 K/UL (ref 1.4–6.5)
NEUTS SEG NFR BLD: 51.1 %
PLATELET # BLD AUTO: 336 K/UL (ref 130–400)
POTASSIUM SERPL-SCNC: 4.1 MEQ/L (ref 3.4–4.9)
PROT SERPL-MCNC: 7.7 G/DL (ref 6.3–8)
PSA SERPL-MCNC: 1.3 NG/ML (ref 0–4)
RBC # BLD AUTO: 4.63 M/UL (ref 4.7–6.1)
SODIUM SERPL-SCNC: 141 MEQ/L (ref 135–144)
TOTAL IRON BINDING CAPACITY: 292 UG/DL (ref 250–450)
UNSATURATED IRON BINDING CAPACITY: 194 UG/DL (ref 112–347)
WBC # BLD AUTO: 5.1 K/UL (ref 4.8–10.8)

## 2024-11-21 RX ORDER — OMEPRAZOLE 40 MG/1
40 CAPSULE, DELAYED RELEASE ORAL
Qty: 90 CAPSULE | Refills: 1 | Status: SHIPPED | OUTPATIENT
Start: 2024-11-21

## 2024-11-21 SDOH — ECONOMIC STABILITY: FOOD INSECURITY: WITHIN THE PAST 12 MONTHS, THE FOOD YOU BOUGHT JUST DIDN'T LAST AND YOU DIDN'T HAVE MONEY TO GET MORE.: NEVER TRUE

## 2024-11-21 SDOH — ECONOMIC STABILITY: FOOD INSECURITY: WITHIN THE PAST 12 MONTHS, YOU WORRIED THAT YOUR FOOD WOULD RUN OUT BEFORE YOU GOT MONEY TO BUY MORE.: NEVER TRUE

## 2024-11-21 SDOH — ECONOMIC STABILITY: INCOME INSECURITY: HOW HARD IS IT FOR YOU TO PAY FOR THE VERY BASICS LIKE FOOD, HOUSING, MEDICAL CARE, AND HEATING?: NOT HARD AT ALL

## 2024-11-21 ASSESSMENT — PATIENT HEALTH QUESTIONNAIRE - PHQ9
2. FEELING DOWN, DEPRESSED OR HOPELESS: NOT AT ALL
1. LITTLE INTEREST OR PLEASURE IN DOING THINGS: NOT AT ALL
SUM OF ALL RESPONSES TO PHQ QUESTIONS 1-9: 0
SUM OF ALL RESPONSES TO PHQ9 QUESTIONS 1 & 2: 0

## 2024-11-21 NOTE — PROGRESS NOTES
(PRILOSEC) 40 MG delayed release capsule     Sig: Take 1 capsule by mouth every morning (before breakfast)     Dispense:  90 capsule     Refill:  1       There are no discontinued medications.     Fu after appt with GI.  Assessment & Plan  1. Heartburn.  He has been experiencing heartburn and chest discomfort daily for the past 3 to 4 months, along with throat inflammation and phlegm. Symptoms are not related to food intake but worsen with heat. He has not taken any over-the-counter medications for relief. Blood work will be conducted today to rule out other conditions. A referral to a gastroenterologist will be made for further evaluation of his esophagus. A prescription for a once-a-day heartburn medication has been sent to his pharmacy. He is advised to take the medication to help alleviate symptoms and prevent potential complications such as Fox's esophagus or esophageal cancer.     2. Groin pain.  He reports bilateral groin pain, sometimes extending to the penis, which occurs sporadically and is not associated with physical activity. Prostate levels will be checked to rule out any prostate-related issues. Further work up including images could be considered in the future    3. Health Maintenance.  A colonoscopy will be scheduled for colon screening. Additionally, an esophagogastroduodenoscopy (EGD) will be performed to evaluate his esophagus and stomach due to his persistent symptoms.    Follow-up  Return after consultation with the gastroenterologist.      Side effects, adverse effects of the medication prescribed today, as well as treatment plan/ rationale and result expectations have been discussed with the patient who expresses understanding and desires to proceed.    Close follow up to evaluate treatment results and for coordination of care.  I have reviewed the patient's medical history in detail and updated the computerized patient record.    As always, patient is advised that if symptoms worsen in

## 2024-11-26 ENCOUNTER — PREP FOR PROCEDURE (OUTPATIENT)
Dept: GASTROENTEROLOGY | Age: 59
End: 2024-11-26

## 2024-11-26 ENCOUNTER — OFFICE VISIT (OUTPATIENT)
Dept: GASTROENTEROLOGY | Age: 59
End: 2024-11-26
Payer: COMMERCIAL

## 2024-11-26 VITALS — HEIGHT: 65 IN | HEART RATE: 76 BPM | WEIGHT: 184 LBS | OXYGEN SATURATION: 98 % | BODY MASS INDEX: 30.66 KG/M2

## 2024-11-26 DIAGNOSIS — R10.84 GENERALIZED ABDOMINAL PAIN: ICD-10-CM

## 2024-11-26 DIAGNOSIS — Z12.11 SCREEN FOR COLON CANCER: ICD-10-CM

## 2024-11-26 DIAGNOSIS — K59.00 CONSTIPATION, UNSPECIFIED CONSTIPATION TYPE: Primary | ICD-10-CM

## 2024-11-26 DIAGNOSIS — K21.9 GASTROESOPHAGEAL REFLUX DISEASE, UNSPECIFIED WHETHER ESOPHAGITIS PRESENT: ICD-10-CM

## 2024-11-26 PROCEDURE — 99204 OFFICE O/P NEW MOD 45 MIN: CPT | Performed by: INTERNAL MEDICINE

## 2024-11-26 NOTE — PROGRESS NOTES
declined   Intimate Partner Violence: Unknown (6/13/2022)    Humiliation, Afraid, Rape, and Kick questionnaire     Fear of Current or Ex-Partner: Patient declined     Emotionally Abused: Patient declined     Physically Abused: Patient declined     Sexually Abused: Patient declined   Housing Stability: Unknown (11/21/2024)    Housing Stability Vital Sign     Homeless in the Last Year: No     Pulse 76, height 1.651 m (5' 5\"), weight 83.5 kg (184 lb), SpO2 98%.  Physical Exam  Constitutional:       General: He is not in acute distress.     Appearance: Normal appearance. He is well-developed.   Eyes:      General: No scleral icterus.  Cardiovascular:      Rate and Rhythm: Normal rate and regular rhythm.   Pulmonary:      Effort: Pulmonary effort is normal.      Breath sounds: Normal breath sounds.   Abdominal:      General: Bowel sounds are normal. There is no distension.      Palpations: Abdomen is soft. There is no mass.      Tenderness: There is no abdominal tenderness. There is no guarding or rebound.   Musculoskeletal:         General: Normal range of motion.   Lymphadenopathy:      Cervical: No cervical adenopathy.   Neurological:      Mental Status: He is alert and oriented to person, place, and time.   Psychiatric:         Behavior: Behavior normal.         Thought Content: Thought content normal.         Judgment: Judgment normal.       Laboratory, Pathology, Radiology reviewed indetail with relevant important investigations summarized below:  Lab Results   Component Value Date    WBC 5.1 11/21/2024    HGB 13.5 (L) 11/21/2024    HCT 40.9 (L) 11/21/2024    MCV 88.3 11/21/2024     11/21/2024     Lab Results   Component Value Date    IRON 98 11/21/2024    TIBC 292 11/21/2024     Lab Results   Component Value Date    FXWXFPJX01 715 05/23/2023      Lab Results   Component Value Date    FOLATE 8.2 05/23/2023     No results found for: \"LABPROT\", \"LABALBU\"   Lab Results   Component Value Date    ALT 16

## 2024-12-05 RX ORDER — SODIUM CHLORIDE 9 MG/ML
INJECTION, SOLUTION INTRAVENOUS CONTINUOUS
Status: CANCELLED | OUTPATIENT
Start: 2024-12-05

## 2024-12-05 RX ORDER — SODIUM CHLORIDE 0.9 % (FLUSH) 0.9 %
5-40 SYRINGE (ML) INJECTION EVERY 12 HOURS SCHEDULED
Status: CANCELLED | OUTPATIENT
Start: 2024-12-05

## 2024-12-05 RX ORDER — SODIUM CHLORIDE 9 MG/ML
INJECTION, SOLUTION INTRAVENOUS PRN
Status: CANCELLED | OUTPATIENT
Start: 2024-12-05

## 2024-12-05 RX ORDER — SODIUM CHLORIDE 0.9 % (FLUSH) 0.9 %
5-40 SYRINGE (ML) INJECTION PRN
Status: CANCELLED | OUTPATIENT
Start: 2024-12-05

## 2024-12-24 ENCOUNTER — HOSPITAL ENCOUNTER (OUTPATIENT)
Dept: PHYSICAL THERAPY | Age: 59
Setting detail: THERAPIES SERIES
Discharge: HOME | End: 2024-12-24
Payer: OTHER GOVERNMENT

## 2024-12-24 PROCEDURE — 97161 PT EVAL LOW COMPLEX 20 MIN: CPT

## 2024-12-24 NOTE — THERAPY EVALUATION
Physical Therapy Evaluation/Plan of Care   [facility]   [facility] [address]  Dept: [phone]  Dept Fax: [phone]  Loc: [phone]    Physical Therapy: Initial Evaluation    General Information    Patient: Hunter Fay (59 y.o.     male)   Examination Date: 2024   :  1965 ;    Confirmed: Yes MRN: [id number]  CSN: [id number]   Insurance: Payor: [employer]CP / Plan: [employer]CP / Product Type: *No Product type* /   Insurance ID: [id number] - (Worker's Comp)  PT Insurance Information: [facility]-  Claim #: [id number] Secondary Insurance (if applicable):     Referring Physician: Garcia Gomez DO       Visits to Date/Visits Approved:  (60 units approved)    No Show/Cancelled Appts: 0 / 0     Medical Diagnosis: Sprain of left rotator cuff capsule, initial encounter [S43.422A]        Treatment Diagnosis: L shoulder pain, UE weakness      SUBJECTIVE:     Onset date: 6/15/2024    Subjective/ Mechanism of Injury: Patient presents to therapy for L shoulder. Reports he strained it work. States he has to lift, push and pull heavy weights. Reports difficulty with movement of L shoulder, states it is stiff and painful. Reports arm was injured in . Denies surgery. Reports he is taking ibuprofen  and using heat for pain relief. Reports some N/T in lower arm, reports he will also get neck spasms that started after injury. States it feels swollen. Difficulty with ADLs due to pain. Reports he is currently working, was given restrictions to follow at work, but states he just does what he can. Had xray in , unremarkable, waiting for approval for MRI. No set RTD.    Precautions/Contraindications/Restrictions: none           Hand Dominance: Right    Interventions for current problem: medications , heat      Pain:   Current: 3/10     Best: 0/10     Worst:9/10 (occurs with movement).     Symptom

## 2024-12-26 PROBLEM — Z12.11 SCREEN FOR COLON CANCER: Status: RESOLVED | Noted: 2024-11-26 | Resolved: 2024-12-26

## 2024-12-30 ENCOUNTER — ANESTHESIA EVENT (OUTPATIENT)
Dept: ENDOSCOPY | Age: 59
End: 2024-12-30
Payer: COMMERCIAL

## 2024-12-30 ENCOUNTER — HOSPITAL ENCOUNTER (OUTPATIENT)
Age: 59
Setting detail: OUTPATIENT SURGERY
Discharge: HOME | End: 2024-12-30
Attending: INTERNAL MEDICINE | Admitting: INTERNAL MEDICINE
Payer: COMMERCIAL

## 2024-12-30 ENCOUNTER — ANESTHESIA (OUTPATIENT)
Dept: ENDOSCOPY | Age: 59
End: 2024-12-30
Payer: COMMERCIAL

## 2024-12-30 VITALS
BODY MASS INDEX: 28.32 KG/M2 | WEIGHT: 170 LBS | TEMPERATURE: 97.4 F | SYSTOLIC BLOOD PRESSURE: 116 MMHG | RESPIRATION RATE: 18 BRPM | DIASTOLIC BLOOD PRESSURE: 69 MMHG | OXYGEN SATURATION: 96 % | HEIGHT: 65 IN | HEART RATE: 60 BPM

## 2024-12-30 PROCEDURE — 45378 DIAGNOSTIC COLONOSCOPY: CPT | Performed by: INTERNAL MEDICINE

## 2024-12-30 PROCEDURE — 7100000010 HC PHASE II RECOVERY - FIRST 15 MIN: Performed by: INTERNAL MEDICINE

## 2024-12-30 PROCEDURE — 2500000003 HC RX 250 WO HCPCS: Performed by: INTERNAL MEDICINE

## 2024-12-30 PROCEDURE — 3609017100 HC EGD: Performed by: INTERNAL MEDICINE

## 2024-12-30 PROCEDURE — 3700000001 HC ADD 15 MINUTES (ANESTHESIA): Performed by: INTERNAL MEDICINE

## 2024-12-30 PROCEDURE — 7100000011 HC PHASE II RECOVERY - ADDTL 15 MIN: Performed by: INTERNAL MEDICINE

## 2024-12-30 PROCEDURE — 43235 EGD DIAGNOSTIC BRUSH WASH: CPT | Performed by: INTERNAL MEDICINE

## 2024-12-30 PROCEDURE — 2709999900 HC NON-CHARGEABLE SUPPLY: Performed by: INTERNAL MEDICINE

## 2024-12-30 PROCEDURE — 6360000002 HC RX W HCPCS: Performed by: REGISTERED NURSE

## 2024-12-30 PROCEDURE — 3609027000 HC COLONOSCOPY: Performed by: INTERNAL MEDICINE

## 2024-12-30 PROCEDURE — 3700000000 HC ANESTHESIA ATTENDED CARE: Performed by: INTERNAL MEDICINE

## 2024-12-30 RX ORDER — SODIUM CHLORIDE 0.9 % (FLUSH) 0.9 %
5-40 SYRINGE (ML) INJECTION PRN
Status: DISCONTINUED | OUTPATIENT
Start: 2024-12-30 | End: 2024-12-30 | Stop reason: HOSPADM

## 2024-12-30 RX ORDER — PROPOFOL 10 MG/ML
INJECTION, EMULSION INTRAVENOUS
Status: DISCONTINUED | OUTPATIENT
Start: 2024-12-30 | End: 2024-12-30 | Stop reason: SDUPTHER

## 2024-12-30 RX ORDER — SODIUM PHOSPHATE, DIBASIC AND SODIUM PHOSPHATE, MONOBASIC 7; 19 G/230ML; G/230ML
1 ENEMA RECTAL ONCE
Status: DISCONTINUED | OUTPATIENT
Start: 2024-12-30 | End: 2024-12-30 | Stop reason: HOSPADM

## 2024-12-30 RX ORDER — LIDOCAINE HYDROCHLORIDE 20 MG/ML
INJECTION, SOLUTION INFILTRATION; PERINEURAL
Status: DISCONTINUED | OUTPATIENT
Start: 2024-12-30 | End: 2024-12-30 | Stop reason: SDUPTHER

## 2024-12-30 RX ORDER — GLYCOPYRROLATE 1 MG/5 ML
SYRINGE (ML) INTRAVENOUS
Status: DISCONTINUED | OUTPATIENT
Start: 2024-12-30 | End: 2024-12-30 | Stop reason: SDUPTHER

## 2024-12-30 RX ORDER — SODIUM CHLORIDE 9 MG/ML
INJECTION, SOLUTION INTRAVENOUS PRN
Status: DISCONTINUED | OUTPATIENT
Start: 2024-12-30 | End: 2024-12-30 | Stop reason: HOSPADM

## 2024-12-30 RX ORDER — SODIUM CHLORIDE 9 MG/ML
INJECTION, SOLUTION INTRAVENOUS CONTINUOUS
Status: DISCONTINUED | OUTPATIENT
Start: 2024-12-30 | End: 2024-12-30 | Stop reason: HOSPADM

## 2024-12-30 RX ORDER — SODIUM CHLORIDE 0.9 % (FLUSH) 0.9 %
5-40 SYRINGE (ML) INJECTION EVERY 12 HOURS SCHEDULED
Status: DISCONTINUED | OUTPATIENT
Start: 2024-12-30 | End: 2024-12-30 | Stop reason: HOSPADM

## 2024-12-30 RX ADMIN — LIDOCAINE HYDROCHLORIDE 60 MG: 20 INJECTION, SOLUTION INFILTRATION; PERINEURAL at 12:09

## 2024-12-30 RX ADMIN — PROPOFOL 50 MG: 10 INJECTION, EMULSION INTRAVENOUS at 12:19

## 2024-12-30 RX ADMIN — PROPOFOL 50 MG: 10 INJECTION, EMULSION INTRAVENOUS at 12:09

## 2024-12-30 RX ADMIN — PROPOFOL 50 MG: 10 INJECTION, EMULSION INTRAVENOUS at 12:14

## 2024-12-30 RX ADMIN — PROPOFOL 50 MG: 10 INJECTION, EMULSION INTRAVENOUS at 12:16

## 2024-12-30 RX ADMIN — Medication 0.2 MG: at 12:09

## 2024-12-30 RX ADMIN — PROPOFOL 150 MG: 10 INJECTION, EMULSION INTRAVENOUS at 12:08

## 2024-12-30 RX ADMIN — PROPOFOL 50 MG: 10 INJECTION, EMULSION INTRAVENOUS at 12:26

## 2024-12-30 ASSESSMENT — PAIN - FUNCTIONAL ASSESSMENT
PAIN_FUNCTIONAL_ASSESSMENT: 0-10

## 2024-12-30 NOTE — PROGRESS NOTES
Dr Brantley ordered fleets enema. Patient did not want it. State \"I can go right now.\" Patient instructed to go and not to flush so this nurse can look at it. Patient did go. Stool was cloudy yellow and I was able to see through to bottom of toilet. Fleets enema not given.

## 2024-12-30 NOTE — ANESTHESIA PRE PROCEDURE
Department of Anesthesiology  Preprocedure Note       Name:  Hunter Fay   Age:  59 y.o.  :  1965                                          MRN:  [id number]         Date:  2024      Surgeon: Surgeon(s):  Nirav Brantley MD    Procedure: Procedure(s):  ESOPHAGOGASTRODUODENOSCOPY  COLONOSCOPY DIAGNOSTIC    Medications prior to admission:   Prior to Admission medications    Medication Sig Start Date End Date Taking? Authorizing Provider   omeprazole (PRILOSEC) 40 MG delayed release capsule Take 1 capsule by mouth every morning (before breakfast) 24   Thelma Li, APRN - CNP       Current medications:    No current facility-administered medications for this encounter.     Current Outpatient Medications   Medication Sig Dispense Refill    omeprazole (PRILOSEC) 40 MG delayed release capsule Take 1 capsule by mouth every morning (before breakfast) 90 capsule 1       Allergies:  No Known Allergies    Problem List:    Patient Active Problem List   Diagnosis Code    Numbness and tingling sensation of skin R20.0, R20.2    Hyperlipidemia E78.5    Mass of skin of back; Right R22.2    Constipation K59.00    Gastroesophageal reflux disease K21.9    Generalized abdominal pain R10.84       Past Medical History:        Diagnosis Date    GERD (gastroesophageal reflux disease)     Hyperlipidemia        Past Surgical History:        Procedure Laterality Date    SKIN LESION EXCISION Right 2023    EXCISION OF BACK MASS performed by Avril Painter MD at [facility] OR       Social History:    Social History     Tobacco Use    Smoking status: Never    Smokeless tobacco: Never    Tobacco comments:     Quit smoking: 10/12/1996   Substance Use Topics    Alcohol use: Not Currently                                Counseling given: Not Answered  Tobacco comments: Quit smoking: 10/12/1996      Vital Signs (Current): There were no vitals filed for this visit.                                           BP Readings from Last 3

## 2024-12-30 NOTE — ANESTHESIA POSTPROCEDURE EVALUATION
Department of Anesthesiology  Postprocedure Note    Patient: Hunter Fay  MRN: [id number]  YOB: 1965  Date of evaluation: 12/30/2024    Procedure Summary       Date: 12/30/24 Room / Location: [facility] OR 02 / [facility]    Anesthesia Start: 1201 Anesthesia Stop: 1231    Procedures:       ESOPHAGOGASTRODUODENOSCOPY      COLONOSCOPY Diagnosis:       Constipation      Gastroesophageal reflux disease, unspecified whether esophagitis present      Generalized abdominal pain      Screen for colon cancer      (Constipation [K59.00])      (Gastroesophageal reflux disease, unspecified whether esophagitis present [K21.9])      (Generalized abdominal pain [R10.84])      (Screen for colon cancer [Z12.11])    Surgeons: Nirav Brantley MD Responsible Provider: Grey Abbasi APRN - CRNA    Anesthesia Type: MAC ASA Status: 3            Anesthesia Type: No value filed.    Kina Phase I: Kina Score: 10    Kina Phase II:      Anesthesia Post Evaluation    Patient location during evaluation: bedside  Patient participation: complete - patient participated  Level of consciousness: awake and awake and alert  Airway patency: patent  Nausea & Vomiting: no nausea and no vomiting  Cardiovascular status: blood pressure returned to baseline and hemodynamically stable  Respiratory status: acceptable  Hydration status: euvolemic  Pain management: adequate        No notable events documented.

## 2024-12-30 NOTE — H&P
Patient Name: Hunter Fay  : 1965  MRN: [id number]  DATE: 24      ENDOSCOPY  History and Physical    Procedure:    [] Diagnostic Colonoscopy       [x] Screening Colonoscopy  [x] EGD      [] ERCP      [] EUS       [] Other    [x] Previous office notes/History and Physical reviewed from the patients chart. Please see EMR for further details of HPI. I have examined the patient's status immediately prior to the procedure and:      Indications/HPI:    [x]Abdominal Pain   []Cancer- GI/Lung  []Fhx of colon CA  []History of Polyps   []Fox’s   []Melena  []Abnormal Imaging   []Dysphagia    []Persistent Pneumonia  []Anemia   []Food Impaction  []History of Polyps  []GI Bleed   []Pulmonary nodule/Mass  []Change in bowel habits  [x]Heartburn/Reflux  []Rectal Bleed (BRBPR)  []Chest Pain - Non Cardiac  []Heme (+) Stool  []Ulcers  []Constipation   []Hemoptysis   []Varices  []Diarrhea   []Hypoxemia  []Nausea/Vomiting   [x]Screening   []Crohns/Colitis  []Other:    Anesthesia:   [x] MAC [] Moderate Sedation   [] General   [] None     ROS: 12 pt Review of Symptoms was negative unless mentioned above    Medications:   Prior to Admission medications    Medication Sig Start Date End Date Taking? Authorizing Provider   omeprazole (PRILOSEC) 40 MG delayed release capsule Take 1 capsule by mouth every morning (before breakfast) 24  Yes Thelma Li APRN - CNP     Allergies: No Known Allergies   History of allergic reaction to anesthesia:  No  Past Medical History:  Past Medical History:   Diagnosis Date    GERD (gastroesophageal reflux disease)     Hyperlipidemia      Past Surgical History:  Past Surgical History:   Procedure Laterality Date    SKIN LESION EXCISION Right 2023    EXCISION OF BACK MASS performed by Avril Painter MD at [facility] OR     Social History:  Social History     Tobacco Use    Smoking status: Never    Smokeless tobacco: Never    Tobacco comments:     Quit smoking: 10/12/1996   Vaping Use

## 2025-01-03 ENCOUNTER — HOSPITAL ENCOUNTER (EMERGENCY)
Age: 60
Discharge: HOME | End: 2025-01-04
Attending: STUDENT IN AN ORGANIZED HEALTH CARE EDUCATION/TRAINING PROGRAM
Payer: OTHER GOVERNMENT

## 2025-01-03 ENCOUNTER — APPOINTMENT (OUTPATIENT)
Dept: CT IMAGING | Age: 60
End: 2025-01-03
Payer: OTHER GOVERNMENT

## 2025-01-03 DIAGNOSIS — R51.9 ACUTE NONINTRACTABLE HEADACHE, UNSPECIFIED HEADACHE TYPE: ICD-10-CM

## 2025-01-03 DIAGNOSIS — S16.1XXA ACUTE STRAIN OF NECK MUSCLE, INITIAL ENCOUNTER: ICD-10-CM
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
## 2025-01-03 PROCEDURE — 6370000000 HC RX 637 (ALT 250 FOR IP): Performed by: STUDENT IN AN ORGANIZED HEALTH CARE EDUCATION/TRAINING PROGRAM

## 2025-01-03 PROCEDURE — 72125 CT NECK SPINE W/O DYE: CPT

## 2025-01-03 PROCEDURE — 70450 CT HEAD/BRAIN W/O DYE: CPT

## 2025-01-03 PROCEDURE — 99284 EMERGENCY DEPT VISIT MOD MDM: CPT

## 2025-01-03 RX ORDER — IBUPROFEN 600 MG/1
600 TABLET, FILM COATED ORAL ONCE
Status: COMPLETED | OUTPATIENT
Start: 2025-01-03 | End: 2025-01-03

## 2025-01-03 RX ORDER — ACETAMINOPHEN 500 MG
1000 TABLET ORAL ONCE
Status: COMPLETED | OUTPATIENT
Start: 2025-01-03 | End: 2025-01-03

## 2025-01-03 RX ADMIN — IBUPROFEN 600 MG: 600 TABLET, FILM COATED ORAL at 22:57

## 2025-01-03 RX ADMIN — ACETAMINOPHEN 1000 MG: 500 TABLET ORAL at 22:56

## 2025-01-03 ASSESSMENT — LIFESTYLE VARIABLES
HOW MANY STANDARD DRINKS CONTAINING ALCOHOL DO YOU HAVE ON A TYPICAL DAY: PATIENT DOES NOT DRINK
HOW OFTEN DO YOU HAVE A DRINK CONTAINING ALCOHOL: NEVER

## 2025-01-03 ASSESSMENT — PAIN DESCRIPTION - LOCATION: LOCATION: HEAD

## 2025-01-03 ASSESSMENT — PAIN DESCRIPTION - DESCRIPTORS: DESCRIPTORS: THROBBING

## 2025-01-03 ASSESSMENT — PAIN SCALES - GENERAL: PAINLEVEL_OUTOF10: 8

## 2025-01-04 ENCOUNTER — HOSPITAL ENCOUNTER (EMERGENCY)
Age: 60
Discharge: HOME | End: 2025-01-04
Payer: OTHER GOVERNMENT

## 2025-01-04 ENCOUNTER — APPOINTMENT (OUTPATIENT)
Dept: CT IMAGING | Age: 60
End: 2025-01-04
Payer: OTHER GOVERNMENT

## 2025-01-04 VITALS
OXYGEN SATURATION: 98 % | BODY MASS INDEX: 28.32 KG/M2 | RESPIRATION RATE: 18 BRPM | HEIGHT: 65 IN | HEART RATE: 64 BPM | WEIGHT: 170 LBS | SYSTOLIC BLOOD PRESSURE: 174 MMHG | DIASTOLIC BLOOD PRESSURE: 96 MMHG | TEMPERATURE: 98 F

## 2025-01-04 VITALS
SYSTOLIC BLOOD PRESSURE: 152 MMHG | HEART RATE: 60 BPM | TEMPERATURE: 98.1 F | DIASTOLIC BLOOD PRESSURE: 89 MMHG | RESPIRATION RATE: 17 BRPM | OXYGEN SATURATION: 96 %

## 2025-01-04 DIAGNOSIS — S29.019A THORACIC MYOFASCIAL STRAIN, INITIAL ENCOUNTER: ICD-10-CM
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
## 2025-01-04 PROCEDURE — 99284 EMERGENCY DEPT VISIT MOD MDM: CPT

## 2025-01-04 PROCEDURE — 72128 CT CHEST SPINE W/O DYE: CPT

## 2025-01-04 RX ORDER — CYCLOBENZAPRINE HCL 5 MG
5 TABLET ORAL 3 TIMES DAILY PRN
Qty: 15 TABLET | Refills: 0 | Status: SHIPPED | OUTPATIENT
Start: 2025-01-04 | End: 2025-01-09

## 2025-01-04 ASSESSMENT — PAIN - FUNCTIONAL ASSESSMENT
PAIN_FUNCTIONAL_ASSESSMENT: PREVENTS OR INTERFERES SOME ACTIVE ACTIVITIES AND ADLS
PAIN_FUNCTIONAL_ASSESSMENT: NONE - DENIES PAIN

## 2025-01-04 ASSESSMENT — ENCOUNTER SYMPTOMS
BACK PAIN: 1
RHINORRHEA: 0
ABDOMINAL PAIN: 0
SHORTNESS OF BREATH: 0
SORE THROAT: 0
COLOR CHANGE: 0
CONSTIPATION: 0
ABDOMINAL DISTENTION: 0
EYE DISCHARGE: 0

## 2025-01-04 ASSESSMENT — PAIN DESCRIPTION - PAIN TYPE: TYPE: ACUTE PAIN

## 2025-01-04 ASSESSMENT — PAIN DESCRIPTION - ORIENTATION: ORIENTATION: RIGHT;LEFT

## 2025-01-04 ASSESSMENT — PAIN DESCRIPTION - LOCATION: LOCATION: NECK

## 2025-01-04 ASSESSMENT — PAIN DESCRIPTION - FREQUENCY: FREQUENCY: CONTINUOUS

## 2025-01-04 ASSESSMENT — PAIN SCALES - GENERAL: PAINLEVEL_OUTOF10: 8

## 2025-01-04 ASSESSMENT — PAIN DESCRIPTION - DESCRIPTORS: DESCRIPTORS: ACHING
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
## 2025-01-04 NOTE — ED PROVIDER NOTES
Extraocular movements intact.      Conjunctiva/sclera: Conjunctivae normal.      Pupils: Pupils are equal, round, and reactive to light.   Neck:      Comments: No midline cervical tenderness no step-offs or deformities  There is tenderness and tense musculature to the left paracervical region and left trapezius region  Cardiovascular:      Rate and Rhythm: Normal rate and regular rhythm.      Heart sounds: Normal heart sounds. No murmur heard.     No friction rub. No gallop.   Pulmonary:      Effort: Pulmonary effort is normal.      Breath sounds: Normal breath sounds. No wheezing or rales.   Abdominal:      General: Bowel sounds are normal. There is no distension.      Palpations: Abdomen is soft.      Tenderness: There is no abdominal tenderness. There is no guarding.   Musculoskeletal:         General: No swelling or tenderness. Normal range of motion.      Cervical back: Normal range of motion and neck supple.      Right lower leg: No edema.      Left lower leg: No edema.      Comments: No tenderness to the extremities, no tenderness of the hips, no midline thoracic or lumbar tenderness no step-offs or deformities muscle strength 5/5 throughout   Skin:     General: Skin is warm and dry.   Neurological:      General: No focal deficit present.      Mental Status: He is alert and oriented to person, place, and time.         DIFFERENTIAL  DIAGNOSIS     PLAN (LABS / IMAGING / EKG):  Orders Placed This Encounter   Procedures    CT Head W/O Contrast    CT CERVICAL SPINE WO CONTRAST       MEDICATIONS ORDERED:  Orders Placed This Encounter   Medications    acetaminophen (TYLENOL) tablet 1,000 mg    ibuprofen (ADVIL;MOTRIN) tablet 600 mg    cyclobenzaprine (FLEXERIL) 5 MG tablet     Sig: Take 1 tablet by mouth 3 times daily as needed for Muscle spasms     Dispense:  15 tablet     Refill:  0           DIAGNOSTIC RESULTS / EMERGENCY DEPARTMENT COURSE / MDM     LABS:  No results found for this visit on
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
## 2025-01-04 NOTE — DISCHARGE INSTRUCTIONS
The CT scans of your head and neck were negative today  You can use Flexeril every 8 hours as needed to help relax your muscles but do not drive or operate machinery while taking it as it can make you drowsy  You can use Tylenol as milligrams ibuprofen 6 mg every hour as needed for pain  Use heat therapy to help relax the muscles  You will likely feel worse over the next couple days before starting to feel better  He can take a warm bath, get plenty of rest and do stretching exercises    For pain use acetaminophen (Tylenol) or ibuprofen (Motrin / Advil), unless prescribed medications that have acetaminophen or ibuprofen (or similar medications) in it.  You can take over the counter acetaminophen tablets (1 - 2 tablets of the 500-mg strength every 6 hours) or ibuprofen tablets (2 tablets every 4 hours).    Soak in a hot shower or bath tub.  You will have more aches and pains tomorrow, but should feel better in several days.    PLEASE RETURN TO THE EMERGENCY DEPARTMENT IMMEDIATELY for worsening of pain, decrease sensation to arms or legs, inability to move arms or legs, shortness of breath, severe chest pain, excessive nausea or vomiting, notice any bruising to your abdomen or have increase in abdominal pain, or if you develop any concerning symptoms such as: high fever not relieved by acetaminophen (Tylenol) and/or ibuprofen (Motrin / Advil), chills, feeling of your heart fluttering or racing, persistent nausea and/or vomiting, vomiting up blood, blood in your stool, loss of consciousness, numbness, weakness or tingling in the arms or legs or change in color of the extremities, changes in mental status, persistent headache, blurry vision, loss of bladder / bowel control, unable to follow up with your physician, or other any other care or concern.

## 2025-01-07 ENCOUNTER — HOSPITAL ENCOUNTER (OUTPATIENT)
Dept: PHYSICAL THERAPY | Age: 60
Setting detail: THERAPIES SERIES
Discharge: HOME | End: 2025-01-07
Payer: OTHER GOVERNMENT

## 2025-01-07 PROCEDURE — 97110 THERAPEUTIC EXERCISES: CPT

## 2025-01-07 PROCEDURE — G0283 ELEC STIM OTHER THAN WOUND: HCPCS

## 2025-01-07 ASSESSMENT — PAIN DESCRIPTION - DESCRIPTORS: DESCRIPTORS: ACHING;SORE

## 2025-01-07 ASSESSMENT — PAIN DESCRIPTION - LOCATION: LOCATION: SHOULDER

## 2025-01-07 ASSESSMENT — PAIN SCALES - GENERAL: PAINLEVEL_OUTOF10: 5

## 2025-01-07 ASSESSMENT — PAIN DESCRIPTION - ORIENTATION: ORIENTATION: RIGHT;LEFT

## 2025-01-10 ENCOUNTER — HOSPITAL ENCOUNTER (OUTPATIENT)
Dept: PHYSICAL THERAPY | Age: 60
Setting detail: THERAPIES SERIES
Discharge: HOME | End: 2025-01-10
Payer: OTHER GOVERNMENT

## 2025-01-10 PROCEDURE — 97110 THERAPEUTIC EXERCISES: CPT

## 2025-01-10 ASSESSMENT — PAIN SCALES - GENERAL: PAINLEVEL_OUTOF10: 5

## 2025-01-10 ASSESSMENT — PAIN DESCRIPTION - ORIENTATION: ORIENTATION: LEFT

## 2025-01-10 ASSESSMENT — PAIN DESCRIPTION - DESCRIPTORS: DESCRIPTORS: ACHING

## 2025-01-10 ASSESSMENT — PAIN DESCRIPTION - LOCATION: LOCATION: SHOULDER
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
## 2025-01-13 ENCOUNTER — HOSPITAL ENCOUNTER (OUTPATIENT)
Dept: PHYSICAL THERAPY | Age: 60
Setting detail: THERAPIES SERIES
Discharge: HOME | End: 2025-01-13
Payer: OTHER GOVERNMENT

## 2025-01-13 PROCEDURE — 97110 THERAPEUTIC EXERCISES: CPT

## 2025-01-13 ASSESSMENT — PAIN DESCRIPTION - LOCATION: LOCATION: SHOULDER

## 2025-01-13 ASSESSMENT — PAIN DESCRIPTION - DESCRIPTORS: DESCRIPTORS: SORE;ACHING

## 2025-01-13 ASSESSMENT — PAIN SCALES - GENERAL: PAINLEVEL_OUTOF10: 5

## 2025-01-13 ASSESSMENT — PAIN DESCRIPTION - ORIENTATION: ORIENTATION: LEFT

## 2025-01-13 NOTE — PROGRESS NOTES
[facility]  Outpatient Physical Therapy    Treatment Note        Date: 2025  Patient: Hunter Fay  : 1965   Confirmed: Yes  MRN: [id number]  Referring Provider: Garcia Gomez DO    Medical Diagnosis: Sprain of left rotator cuff capsule, initial encounter [S43.422A]       Treatment Diagnosis: L shoulder pain, UE weakness    Visit Information:  Insurance: Payor: [employer]PARTMENT [employer] OWCP / Plan: [employer]MENT [employer] OWCP / Product Type: *No Product type* /   PT Visit Information  PT Insurance Information: [employer]-  Claim #: [id number]  Total # of Visits Approved: 12 (60 units approved)  Total # of Visits to Date: 4  Plan of Care/Certification Expiration Date: 25  No Show: 0  Canceled Appointment: 0  Progress Note Counter:  (8/60 units)    Subjective Information:  Subjective: Patient reports continued discomfort in Lt shoulder, especially after sleeping.  HEP Compliance:  [] Good [x] Fair [] Poor [] Reports not doing due to:               Pain Screening  Patient Currently in Pain: Yes  Pain Level: 5  Pain Location: Shoulder  Pain Orientation: Left  Pain Descriptors: Sore, Aching    Treatment:  Exercises:  Exercises  Exercise 3: pulleys flex 1'30 secs- declined further due to pain  Exercise 5: Shoulder iso's 3'' x10  Exercise 6: Attempted UT str, increased pain in Rt side  Exercise 11: Scap retractions 3''x10,  shoulder rolls x10  Exercise 20: HEP: continue current  + scap retractons, post shoulder rolls      *Indicates exercise, modality, or manual techniques to be initiated when appropriate           Assessment:   Body Structures, Functions, Activity Limitations Requiring Skilled Therapeutic Intervention: Decreased ROM, Decreased ADL status, Decreased body mechanics, Decreased tolerance to work activity, Decreased strength, Increased pain  Assessment: Initiated gentle posture exercises to improve posture and decrease pain. VCs to maintain exerises within a comfortable range. Patient

## 2025-01-17 ENCOUNTER — HOSPITAL ENCOUNTER (OUTPATIENT)
Dept: PHYSICAL THERAPY | Age: 60
Setting detail: THERAPIES SERIES
Discharge: HOME | End: 2025-01-17
Payer: OTHER GOVERNMENT

## 2025-01-17 PROCEDURE — 97110 THERAPEUTIC EXERCISES: CPT

## 2025-01-17 ASSESSMENT — PAIN DESCRIPTION - DESCRIPTORS: DESCRIPTORS: SORE;ACHING

## 2025-01-17 ASSESSMENT — PAIN DESCRIPTION - ORIENTATION: ORIENTATION: LEFT

## 2025-01-17 ASSESSMENT — PAIN SCALES - GENERAL: PAINLEVEL_OUTOF10: 5

## 2025-01-17 ASSESSMENT — PAIN DESCRIPTION - LOCATION: LOCATION: SHOULDER

## 2025-01-17 NOTE — PROGRESS NOTES
[facility]  Outpatient Physical Therapy   Treatment Note        Date: 2025  Patient: Hunter Fay  : 1965   Confirmed: Yes  MRN: [id number]  Referring Provider: Garcia Gomez DO      Medical Diagnosis: Sprain of left rotator cuff capsule, initial encounter [S43.422A]      Treatment Diagnosis: L shoulder pain, UE weakness    Visit Information:  Insurance: Payor: [employer]PARTMENT [employer] OWCP / Plan: [employer]MENT [employer] OWCP / Product Type: *No Product type* /   PT Visit Information  PT Insurance Information: C-  Claim #: [id number]  Total # of Visits Approved: 12 (60 units approved)  Total # of Visits to Date: 5  Plan of Care/Certification Expiration Date: 25  No Show: 0  Canceled Appointment: 0  Progress Note Counter:  (10/60 units)    Subjective Information:  Subjective: Patient reports continued left shoulder pain.  Patient was late because he had an appointment with [facility].  HEP Compliance:  [x] Good [] Fair [] Poor [] Reports not doing due to:             Pain Screening  Patient Currently in Pain: Yes  Pain Assessment: 0-10  Pain Level: 5  Pain Location: Shoulder  Pain Orientation: Left  Pain Descriptors: Sore, Aching (knotting)    Treatment:  Exercises:  Exercises  Exercise 3: pulleys flexion x 3 minutes (improved tolerance)  Exercise 5: shoulder isometrics 6 way (25-50% effort) 3 sec hold x 10 reps  Exercise 6: seated upper trap stretch 10 sec hold x 3;  Exercise 11: Scap retractions 3''x10,seated posterior shoulder rolls x 7  Exercise 20: HEP: continue with current     Modalities:  Moist Heat (CPT 54818)  Patient Position: Seated  Number Minutes Moist Heat: 10  Moist heat location: Cervical, Shoulder, Right  Post treatment skin assessment: Intact  Limitations addressed: Pain modulation, Tissue extensibility       *Indicates exercise, modality, or manual techniques to be initiated when appropriate      Assessment:   Body Structures, Functions, Activity Limitations

## 2025-01-24 ENCOUNTER — TRANSCRIBE ORDERS (OUTPATIENT)
Dept: ADMINISTRATIVE | Age: 60
End: 2025-01-24

## 2025-01-24 DIAGNOSIS — S13.4XXA WHIPLASH INJURY TO NECK, INITIAL ENCOUNTER: Primary | ICD-10-CM

## 2025-01-29 ENCOUNTER — HOSPITAL ENCOUNTER (EMERGENCY)
Age: 60
Discharge: HOME | End: 2025-01-29
Payer: OTHER GOVERNMENT

## 2025-01-29 ENCOUNTER — APPOINTMENT (OUTPATIENT)
Dept: GENERAL RADIOLOGY | Age: 60
End: 2025-01-29
Payer: OTHER GOVERNMENT

## 2025-01-29 ENCOUNTER — TRANSCRIBE ORDERS (OUTPATIENT)
Dept: ADMINISTRATIVE | Age: 60
End: 2025-01-29

## 2025-01-29 VITALS
WEIGHT: 175 LBS | TEMPERATURE: 97.9 F | HEART RATE: 71 BPM | HEIGHT: 65 IN | DIASTOLIC BLOOD PRESSURE: 89 MMHG | RESPIRATION RATE: 17 BRPM | OXYGEN SATURATION: 98 % | BODY MASS INDEX: 29.16 KG/M2 | SYSTOLIC BLOOD PRESSURE: 138 MMHG

## 2025-01-29 DIAGNOSIS — S43.422A SPRAIN OF LEFT ROTATOR CUFF CAPSULE, INITIAL ENCOUNTER: Primary | ICD-10-CM

## 2025-01-29 DIAGNOSIS — S42.291A HILL SACHS DEFORMITY, RIGHT: ICD-10-CM

## 2025-01-29 DIAGNOSIS — M79.601 RIGHT ARM PAIN: Primary | ICD-10-CM

## 2025-01-29 PROCEDURE — 73090 X-RAY EXAM OF FOREARM: CPT

## 2025-01-29 PROCEDURE — 99283 EMERGENCY DEPT VISIT LOW MDM: CPT

## 2025-01-29 PROCEDURE — 73060 X-RAY EXAM OF HUMERUS: CPT

## 2025-01-29 RX ORDER — KETOROLAC TROMETHAMINE 10 MG/1
10 TABLET, FILM COATED ORAL EVERY 6 HOURS PRN
Qty: 20 TABLET | Refills: 0 | Status: SHIPPED | OUTPATIENT
Start: 2025-01-29

## 2025-01-29 ASSESSMENT — PAIN SCALES - GENERAL: PAINLEVEL_OUTOF10: 8

## 2025-01-29 ASSESSMENT — PAIN DESCRIPTION - PAIN TYPE: TYPE: ACUTE PAIN

## 2025-01-29 ASSESSMENT — PAIN DESCRIPTION - DESCRIPTORS: DESCRIPTORS: ACHING;THROBBING

## 2025-01-29 ASSESSMENT — PAIN DESCRIPTION - LOCATION: LOCATION: SHOULDER;ARM

## 2025-01-29 ASSESSMENT — PAIN DESCRIPTION - ORIENTATION: ORIENTATION: RIGHT

## 2025-01-29 ASSESSMENT — PAIN - FUNCTIONAL ASSESSMENT: PAIN_FUNCTIONAL_ASSESSMENT: 0-10

## 2025-01-29 NOTE — DISCHARGE INSTRUCTIONS
Take medications as directed.     RICE (rest, ice, compress, elevate).     Follow-up with PCP, orthopedics.     Return to ED if any new, or worsening symptoms.
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
## 2025-01-31 ENCOUNTER — HOSPITAL ENCOUNTER (OUTPATIENT)
Dept: MRI IMAGING | Age: 60
Discharge: HOME | End: 2025-01-31
Payer: OTHER GOVERNMENT

## 2025-01-31 DIAGNOSIS — S13.4XXA WHIPLASH INJURY TO NECK, INITIAL ENCOUNTER: ICD-10-CM

## 2025-01-31 PROCEDURE — 72141 MRI NECK SPINE W/O DYE: CPT

## 2025-02-04 ENCOUNTER — HOSPITAL ENCOUNTER (OUTPATIENT)
Dept: MRI IMAGING | Age: 60
Discharge: HOME | End: 2025-02-06
Payer: OTHER GOVERNMENT

## 2025-02-04 DIAGNOSIS — S43.422A SPRAIN OF LEFT ROTATOR CUFF CAPSULE, INITIAL ENCOUNTER: ICD-10-CM

## 2025-02-04 PROCEDURE — 73221 MRI JOINT UPR EXTREM W/O DYE: CPT

## 2025-02-12 ENCOUNTER — HOSPITAL ENCOUNTER (OUTPATIENT)
Dept: PHYSICAL THERAPY | Age: 60
Setting detail: THERAPIES SERIES
Discharge: HOME | End: 2025-02-12
Payer: OTHER GOVERNMENT

## 2025-02-12 PROCEDURE — 97161 PT EVAL LOW COMPLEX 20 MIN: CPT

## 2025-02-12 PROCEDURE — 97140 MANUAL THERAPY 1/> REGIONS: CPT
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
## 2025-02-14 ENCOUNTER — HOSPITAL ENCOUNTER (OUTPATIENT)
Dept: PHYSICAL THERAPY | Age: 60
Setting detail: THERAPIES SERIES
Discharge: HOME | End: 2025-02-14
Payer: OTHER GOVERNMENT

## 2025-02-14 PROCEDURE — 97140 MANUAL THERAPY 1/> REGIONS: CPT

## 2025-02-14 PROCEDURE — G0283 ELEC STIM OTHER THAN WOUND: HCPCS

## 2025-02-14 PROCEDURE — 97110 THERAPEUTIC EXERCISES: CPT

## 2025-02-14 ASSESSMENT — PAIN SCALES - GENERAL: PAINLEVEL_OUTOF10: 6

## 2025-02-14 ASSESSMENT — PAIN DESCRIPTION - ORIENTATION: ORIENTATION: RIGHT

## 2025-02-14 ASSESSMENT — PAIN DESCRIPTION - DESCRIPTORS: DESCRIPTORS: SHARP;TIGHTNESS

## 2025-02-14 ASSESSMENT — PAIN DESCRIPTION - LOCATION: LOCATION: NECK;SHOULDER

## 2025-02-17 ENCOUNTER — OFFICE VISIT (OUTPATIENT)
Dept: ORTHOPEDIC SURGERY | Facility: CLINIC | Age: 60
End: 2025-02-17
Payer: OTHER GOVERNMENT

## 2025-02-17 ENCOUNTER — HOSPITAL ENCOUNTER (OUTPATIENT)
Dept: RADIOLOGY | Facility: CLINIC | Age: 60
Discharge: HOME | End: 2025-02-17
Payer: OTHER GOVERNMENT

## 2025-02-17 DIAGNOSIS — M54.2 NECK PAIN: ICD-10-CM

## 2025-02-17 DIAGNOSIS — M54.12 CERVICAL RADICULOPATHY: Primary | ICD-10-CM

## 2025-02-17 PROCEDURE — 99214 OFFICE O/P EST MOD 30 MIN: CPT | Performed by: PHYSICIAN ASSISTANT

## 2025-02-17 PROCEDURE — 72050 X-RAY EXAM NECK SPINE 4/5VWS: CPT
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
## 2025-02-19 ENCOUNTER — HOSPITAL ENCOUNTER (OUTPATIENT)
Dept: PHYSICAL THERAPY | Age: 60
Setting detail: THERAPIES SERIES
Discharge: HOME | End: 2025-02-19
Payer: OTHER GOVERNMENT

## 2025-02-19 PROCEDURE — 97110 THERAPEUTIC EXERCISES: CPT

## 2025-02-19 ASSESSMENT — PAIN DESCRIPTION - LOCATION: LOCATION: SHOULDER

## 2025-02-19 ASSESSMENT — PAIN DESCRIPTION - PAIN TYPE: TYPE: CHRONIC PAIN

## 2025-02-19 ASSESSMENT — PAIN DESCRIPTION - ORIENTATION: ORIENTATION: LEFT

## 2025-02-19 ASSESSMENT — PAIN SCALES - GENERAL: PAINLEVEL_OUTOF10: 3

## 2025-02-19 NOTE — PROGRESS NOTES
[facility]  Outpatient Physical Therapy   Treatment Note        Date: 2025  Patient: Hunter Fay  : 1965   Confirmed: Yes  MRN: [id number]  Referring Provider: Garcia Gomez DO      Medical Diagnosis: Sprain of left rotator cuff capsule, initial encounter [S43.422A]   Treatment Diagnosis: L shoulder pain, UE weakness    Visit Information:  Insurance: Payor: [employer] DEPARTMENT OF LABOR OWCP / Plan: [employer] DEPARTMENT OF LABOR OWCP / Product Type: *No Product type* /   PT Visit Information  PT Insurance Information: C-  Claim #: [id number]  Total # of Visits Approved: 12 (60 units approved)  Total # of Visits to Date: 5  Plan of Care/Certification Expiration Date: 25  No Show: 2  Canceled Appointment: 0  Progress Note Counter: SHOULDER:  (Therex: 1160 units ; Estim: 60 units)    Subjective Information:  Subjective: Patient reports shoulder is alot better on occassion, states pain with lifting, or stretching too much  HEP Compliance:  [x] Good [] Fair [] Poor [] Reports not doing due to:    Pain Screening  Patient Currently in Pain: Denies  Pain Assessment: 0-10  Pain Level: 3  Pain Type: Chronic pain  Pain Location: Shoulder  Pain Orientation: Left    Treatment:  Exercises:  Exercises  Exercise 5: scap punches supine (L) x10 reps  Exercise 10: table slides: flexion and scaption (L shoulder) 10 reps x 5 sec holds  Exercise 11: 4-way shoulder iso with ball 10 reps x3 sec holds  Exercise 12: supine shoulder PROM: flexion, abd, ER x8 min  Exercise 13: 90-90 ABCs x1 set  Exercise 20: HEP: continue with current + scap punches, 90-90 abcs    Manual:   Manual Therapy  Other: Ktape to L shoulder x5 min    Modalities:  Moist Heat (CPT 98229)  Patient Position: Seated  Number Minutes Moist Heat: 10  Moist heat location: Shoulder, Right, Left  Post treatment skin assessment: Intact  Limitations addressed: Pain modulation, Tissue extensibility    *Indicates exercise, modality, or manual techniques to

## 2025-02-21 ENCOUNTER — HOSPITAL ENCOUNTER (OUTPATIENT)
Dept: PHYSICAL THERAPY | Age: 60
Setting detail: THERAPIES SERIES
Discharge: HOME | End: 2025-02-21
Payer: OTHER GOVERNMENT

## 2025-02-21 PROCEDURE — 97110 THERAPEUTIC EXERCISES: CPT

## 2025-02-21 PROCEDURE — G0283 ELEC STIM OTHER THAN WOUND: HCPCS

## 2025-02-21 NOTE — PROGRESS NOTES
[facility]  Outpatient Physical Therapy    Treatment Note        Date: 2025  Patient: Hunter Fay  : 1965   Confirmed: Yes  MRN: [id number]  Referring Provider: Garcia Gomez DO    Medical Diagnosis: Sprain of ligaments of cervical spine, initial encounter [S13.4XXA]  Sprain of ligaments of thoracic spine, initial encounter [S23.3XXA]  Tension-type headache, unspecified, not intractable [G44.209]       Treatment Diagnosis: Neck pain, Headaches, R shoulder radiating pain    Visit Information:  Insurance: Payor: [employer] DEPARTMENT OF LABOR OWCP / Plan: [employer] DEPARTMENT OF LABOR OWCP / Product Type: *No Product type* /   PT Visit Information  PT Insurance Information: [facility]  Total # of Visits Approved: 60  Total # of Visits to Date: 3  Plan of Care/Certification Expiration Date: 25  No Show: 0  Canceled Appointment: 0  Progress Note Counter: (Neck) 3/8 ( 60 units per CPT code)Manual , TE , Estim     Subjective Information:  Subjective: Pt reports neck is doing better since last visit, stiffness only  HEP Compliance:  [] Good [x] Fair [] Poor [] Reports not doing due to:    Pain Screening  Patient Currently in Pain: Denies    Treatment:  Exercises:  Exercises  Exercise 2: chin tucks 5 sec x 10  Exercise 3: UT/Levator str 10-20 secs x 2-3 ea  Exercise 4: barrel str Low arms 5\" x 10  Exercise 5: scap punches standing at wall  x10 reps  Exercise 6: towel rotation 3\" x 5 ea  Exercise 7: SCM [id number] x 2ea  Exercise 8: shoulder post rolls / shrugs x 10 ea, scap retraction x 10  Exercise 9: radial  nerve flossing in standing x 5-6 reps       Manual: NT          Modalities:  Moist Heat (CPT 43775)  Patient Position: Seated  Moist heat location: Cervical, Right, Shoulder  Post treatment skin assessment: Redness - no adverse reaction  Limitations addressed: Pain modulation, Tissue extensibility  Electric stimulation, unattended (CPT 16251) /  (Medicare)  Patient Position: Seated  E-stim

## 2025-02-24 ENCOUNTER — HOSPITAL ENCOUNTER (OUTPATIENT)
Dept: PHYSICAL THERAPY | Age: 60
Setting detail: THERAPIES SERIES
Discharge: HOME | End: 2025-02-24
Payer: OTHER GOVERNMENT

## 2025-02-24 PROCEDURE — 97110 THERAPEUTIC EXERCISES: CPT

## 2025-02-24 ASSESSMENT — PAIN DESCRIPTION - PAIN TYPE: TYPE: CHRONIC PAIN

## 2025-02-24 ASSESSMENT — PAIN DESCRIPTION - LOCATION: LOCATION: SHOULDER

## 2025-02-24 ASSESSMENT — PAIN DESCRIPTION - DESCRIPTORS: DESCRIPTORS: SHARP;TIGHTNESS

## 2025-02-24 ASSESSMENT — PAIN DESCRIPTION - ORIENTATION: ORIENTATION: LEFT

## 2025-02-24 ASSESSMENT — PAIN SCALES - GENERAL: PAINLEVEL_OUTOF10: 3

## 2025-02-24 NOTE — PROGRESS NOTES
[facility]  Outpatient Physical Therapy   Treatment Note        Date: 2025  Patient: Hunter Fay  : 1965   Confirmed: Yes  MRN: [id number]  Referring Provider: Garcia Gomez DO    Medical Diagnosis: Sprain of left rotator cuff capsule, initial encounter [S43.422A]   Treatment Diagnosis: L shoulder pain, UE weakness    Visit Information:  Insurance: Payor: [employer] DEPARTMENT OF LABOR OWCP / Plan: [employer] DEPARTMENT OF LABOR OWCP / Product Type: *No Product type* /   PT Visit Information  PT Insurance Information: C-  Claim #: [id number]  Total # of Visits Approved: 12 (60 units approved)  Total # of Visits to Date: 6  Plan of Care/Certification Expiration Date: 25  No Show: 2  Canceled Appointment: 0  Progress Note Counter: SHOULDER:  (Therex: 14/60 units ; Estim: 1/60 units)    Subjective Information:  Subjective: Pt reports that the shouler does flare up with exercises.  HEP Compliance:  [] Good [x] Fair [] Poor [] Reports not doing due to:    Pain Screening  Patient Currently in Pain: Yes  Pain Assessment: 0-10  Pain Level: 3  Pain Type: Chronic pain  Pain Location: Shoulder  Pain Orientation: Left  Pain Descriptors: Sharp, Tightness    Treatment:  Exercises:  Exercises  Exercise 5: scap punches supine (L) x10 reps  Exercise 10: table slides: flexion and scaption (L shoulder) 15 reps x 5 sec holds  Exercise 11: 4-way shoulder iso with ball 10 reps x3 sec holds  Exercise 12: supine shoulder PROM: flexion, abd, ER x8 min  Exercise 13: 90-90 ABCs x1 set    Modalities:  Moist Heat (CPT 89237)  Patient Position: Supine  Number Minutes Moist Heat: 10  Moist heat location: Shoulder, Left  Post treatment skin assessment: Redness - no adverse reaction  Limitations addressed: Pain modulation, Tissue extensibility       *Indicates exercise, modality, or manual techniques to be initiated when appropriate    Objective Measures:   LTG 1 Current Status:: : flexion: 100 deg with pain ; abduction:

## 2025-02-28 ENCOUNTER — HOSPITAL ENCOUNTER (OUTPATIENT)
Dept: PHYSICAL THERAPY | Age: 60
Setting detail: THERAPIES SERIES
Discharge: HOME | End: 2025-02-28
Payer: OTHER GOVERNMENT

## 2025-02-28 PROCEDURE — 97140 MANUAL THERAPY 1/> REGIONS: CPT

## 2025-02-28 PROCEDURE — 97110 THERAPEUTIC EXERCISES: CPT

## 2025-02-28 ASSESSMENT — PAIN DESCRIPTION - PAIN TYPE: TYPE: CHRONIC PAIN

## 2025-02-28 ASSESSMENT — PAIN DESCRIPTION - DESCRIPTORS: DESCRIPTORS: ACHING;SHARP

## 2025-02-28 ASSESSMENT — PAIN DESCRIPTION - LOCATION: LOCATION: SHOULDER

## 2025-02-28 ASSESSMENT — PAIN DESCRIPTION - ORIENTATION: ORIENTATION: LEFT

## 2025-02-28 ASSESSMENT — PAIN SCALES - GENERAL: PAINLEVEL_OUTOF10: 3

## 2025-02-28 NOTE — PROGRESS NOTES
[facility]  Outpatient Physical Therapy   Treatment Note        Date: 2025  Patient: Hunter Fay  : 1965   Confirmed: Yes  MRN: [id number]  Referring Provider: Garcia Gomez DO      Medical Diagnosis: Sprain of ligaments of cervical spine, initial encounter [S13.4XXA]  Sprain of ligaments of thoracic spine, initial encounter [S23.3XXA]  Tension-type headache, unspecified, not intractable [G44.209]      Treatment Diagnosis: Neck pain, Headaches, R shoulder radiating pain    Visit Information:  Insurance: Payor: [employer] DEPARTMENT OF LABOR OWCP / Plan: [employer] DEPARTMENT OF LABOR OWCP / Product Type: *No Product type* /   PT Visit Information  PT Insurance Information: [facility]#[id number]  Total # of Visits Approved: 60 (60 units per CPT code)  Total # of Visits to Date: 4  Plan of Care/Certification Expiration Date: 25  No Show: 1  Canceled Appointment: 0  Progress Note Counter: NECK:  ;  (Manual 3/60, TE , Estim )    Subjective Information:  Subjective: Pt reports that he see chandra next week. Pt had some he had some spasms this am. Pt reports that the pain hasnt woke him up. .  HEP Compliance:  [x] Good [] Fair [] Poor [] Reports not doing due to:      Pain Screening  Patient Currently in Pain: Yes  Pain Assessment: 0-10  Pain Level: 3  Pain Type: Chronic pain  Pain Location: Shoulder  Pain Orientation: Left  Pain Descriptors: Aching, Sharp    Treatment:  Exercises:  Exercises  Exercise 2: chin tucks 5 sec x 10  Exercise 3: UT/Levator str 10-20 secs x 2-3 ea  Exercise 4: barrel str Low arms 5\" x 10  Exercise 6: towel rotation 10\" x 5 ea  Exercise 7: SCM [id number] x 2ea  Exercise 8: shoulder post rolls / shrugs x 10 ea, scap retraction x 10       Manual:   Manual Therapy  Manual Traction: distraction  Soft Tissue Mobilizaton: suboccipital release, neck, UT STM     Modalities   HP cervical 10 min supine     *Indicates exercise, modality, or manual techniques to be initiated when

## 2025-03-03 ENCOUNTER — HOSPITAL ENCOUNTER (OUTPATIENT)
Dept: PHYSICAL THERAPY | Age: 60
Setting detail: THERAPIES SERIES
Discharge: HOME | End: 2025-03-03

## 2025-03-03 NOTE — PROGRESS NOTES
Therapy                            Cancellation/No-show Note    Date: 2025  Patient: Hunter Fay (59 y.o. male)  : 1965  MRN:  [id number]  Referring Physician: Garcia Gomez DO    Medical Diagnosis: Sprain of ligaments of cervical spine, initial encounter [S13.4XXA]  Sprain of ligaments of thoracic spine, initial encounter [S23.3XXA]  Tension-type headache, unspecified, not intractable [G44.209]      Visit Information:  Insurance: Payor: [employer] DEPARTMENT OF LABOR OWCP / Plan: [employer] OF LABOR OWCP / Product Type: *No Product type* /   Visits to Date: 4   No Show/Cancelled Appts:       For today's appointment patient:  [x]  Cancelled  []  Rescheduled appointment  []  No-show   []  Called pt to remind of next appointment     Reason given by patient:  []  Patient ill  []  Conflicting appointment  []  No transportation    []  Conflict with work  []  No reason given  [x]  Other:  States he will call back to schedule more after F/U with MD    [] Pt has future appointments scheduled, no follow up needed  [] Pt requests to be on hold.    Reason:   If > 2 weeks please discuss with therapist.  [] Therapist to call pt for follow up  [x]  to call pt to reschedule   Comments:       Signature: Electronically signed by Erika Elena PTA on 3/3/25 at 10:14 AM EST

## 2025-03-11 ENCOUNTER — APPOINTMENT (OUTPATIENT)
Dept: PHYSICAL THERAPY | Age: 60
End: 2025-03-11
Payer: OTHER GOVERNMENT

## 2025-03-21 ENCOUNTER — HOSPITAL ENCOUNTER (OUTPATIENT)
Dept: PHYSICAL THERAPY | Age: 60
Setting detail: THERAPIES SERIES
Discharge: HOME | End: 2025-03-21
Payer: OTHER GOVERNMENT

## 2025-03-21 PROCEDURE — 97110 THERAPEUTIC EXERCISES: CPT

## 2025-03-21 PROCEDURE — G0283 ELEC STIM OTHER THAN WOUND: HCPCS

## 2025-03-21 ASSESSMENT — PAIN DESCRIPTION - DESCRIPTORS: DESCRIPTORS: TIGHTNESS;SHARP

## 2025-03-21 ASSESSMENT — PAIN DESCRIPTION - LOCATION: LOCATION: NECK

## 2025-03-21 ASSESSMENT — PAIN SCALES - GENERAL: PAINLEVEL_OUTOF10: 2

## 2025-03-21 ASSESSMENT — PAIN DESCRIPTION - ORIENTATION: ORIENTATION: RIGHT

## 2025-03-21 ASSESSMENT — PAIN DESCRIPTION - PAIN TYPE: TYPE: CHRONIC PAIN

## 2025-03-21 NOTE — PROGRESS NOTES
[facility]  Outpatient Physical Therapy    Treatment Note        Date: 3/21/2025  Patient: Hunter Fay  : 1965   Confirmed: Yes  MRN: [id number]  Referring Provider: Garcia Gomez DO    Medical Diagnosis: Sprain of ligaments of cervical spine, initial encounter [S13.4XXA]  Sprain of ligaments of thoracic spine, initial encounter [S23.3XXA]  Tension-type headache, unspecified, not intractable [G44.209]       Treatment Diagnosis: Neck pain, Headaches, R shoulder radiating pain    Visit Information:  Insurance: Payor: [employer] DEPARTMENT OF LABOR OWCP / Plan: [employer] DEPARTMENT OF LABOR OWCP / Product Type: *No Product type* /   PT Visit Information  PT Insurance Information: [facility]#[id number]  Total # of Visits Approved: 60 (60 units per CPT code)  Total # of Visits to Date: 5  Plan of Care/Certification Expiration Date: 25 (updated 3/21/25 per [place])  No Show: 1  Canceled Appointment: 1  Progress Note Counter: NECK:  ;  (Manual 3/60, TE 10/60, Estim 3/60)    Subjective Information:  Subjective: Pt states his neck is stiffening back up since going back to work  HEP Compliance:  [x] Good [] Fair [] Poor [] Reports not doing due to:    Pain Screening  Patient Currently in Pain: Yes  Pain Assessment: 0-10  Pain Level: 2 (6/10 Rt arm)  Pain Type: Chronic pain  Pain Location: Neck  Pain Orientation: Right  Pain Descriptors: Tightness, Sharp    Treatment:  Exercises:  Exercises  Exercise 2: chin tucks 5 sec x 10, cervcial retraction x 10 with pain  Exercise 3: B UT/Levator str 10-20 secs x 2-3 ea  Exercise 4: barrel str Low arms 5\" x 10  Exercise 5: scap punches seated  (B ) x10 reps  Exercise 6: towel rotation 10\" x 5 ea, ext 5\" x 5  Exercise 8: cervcial isometrics:flex 5\" x 5 , ext 5\" x 5,Willie SB 5\" x 5  Exercise 9: chest pulls YTB x 10 to decrease scap tightness and improve strength       Manual: NA     Modalities:  Moist Heat (CPT 44131)  Patient Position: Seated  Moist heat location: Cervical, Right,

## 2025-03-25 ENCOUNTER — HOSPITAL ENCOUNTER (OUTPATIENT)
Dept: PHYSICAL THERAPY | Age: 60
Setting detail: THERAPIES SERIES
Discharge: HOME | End: 2025-03-25
Payer: OTHER GOVERNMENT

## 2025-03-25 PROCEDURE — G0283 ELEC STIM OTHER THAN WOUND: HCPCS

## 2025-03-25 PROCEDURE — 97110 THERAPEUTIC EXERCISES: CPT

## 2025-03-25 NOTE — PROGRESS NOTES
[facility]  Outpatient Physical Therapy   Treatment Note        Date: 3/25/2025  Patient: Hunter Fay  : 1965   Confirmed: Yes  MRN: [id number]  Referring Provider: Garcia Gomez DO      Medical Diagnosis: Sprain of ligaments of cervical spine, initial encounter [S13.4XXA]  Sprain of ligaments of thoracic spine, initial encounter [S23.3XXA]  Tension-type headache, unspecified, not intractable [G44.209]      Treatment Diagnosis: Neck pain, Headaches, R shoulder radiating pain    Visit Information:  Insurance: Payor: [employer] DEPARTMENT OF LABOR OWCP / Plan: [employer] DEPARTMENT OF LABOR OWCP / Product Type: *No Product type* /   PT Visit Information  PT Insurance Information: [facility]#[id number]  Total # of Visits Approved: 60 (60 units per CPT code)  Total # of Visits to Date: 6  Plan of Care/Certification Expiration Date: 25 (updated 3/21/25 per [place])  No Show: 1  Canceled Appointment: 1  Progress Note Counter: NECK:  ;  (Manual 3/60, TE , Estim )    Subjective Information:  Subjective: Denies pain, reports tightness in neck. Has not been able to complete exercises at home due to work.  HEP Compliance:  [x] Good [] Fair [] Poor [] Reports not doing due to:    Pain Screening  Patient Currently in Pain: Denies    Treatment:  Exercises:  Exercises  Exercise 1: UBE L1.5 F/R x4 min total  Exercise 2: cervical retraction 15 reps x 3-5 sec holds; with rotation x10 reps, sharita  Exercise 3: B UT/Levator str 20sec holds x3 reps, ea  Exercise 5: scap punches seated  (B ) x10 reps x YTB  Exercise 7: SCM ~20sec x 3 reps, sharita  Exercise 8: cervcial isometrics: flex, ext, sbing 10 reps x 3-5 sec holds  Exercise 9: chest pulls YTB x10 reps ; sharita ER YTB x10 reps  Exercise 10: radial nerve flossing ('s tip) x10 reps ; wrist flexion (for radial nerve mobilization) 5 reps x 10\" holds  Exercise 20: HEP: continue current + cervical iso    Modalities:  Moist Heat (CPT 46638)  Patient Position: Seated  Moist heat

## 2025-04-08 ENCOUNTER — HOSPITAL ENCOUNTER (OUTPATIENT)
Dept: PHYSICAL THERAPY | Age: 60
Setting detail: THERAPIES SERIES
Discharge: HOME | End: 2025-04-08
Payer: OTHER GOVERNMENT

## 2025-04-08 PROCEDURE — 97140 MANUAL THERAPY 1/> REGIONS: CPT

## 2025-04-08 PROCEDURE — G0283 ELEC STIM OTHER THAN WOUND: HCPCS

## 2025-04-08 PROCEDURE — 97110 THERAPEUTIC EXERCISES: CPT

## 2025-04-08 ASSESSMENT — PAIN DESCRIPTION - PAIN TYPE: TYPE: CHRONIC PAIN

## 2025-04-08 ASSESSMENT — PAIN DESCRIPTION - LOCATION: LOCATION: NECK

## 2025-04-08 ASSESSMENT — PAIN DESCRIPTION - ORIENTATION: ORIENTATION: RIGHT

## 2025-04-08 ASSESSMENT — PAIN SCALES - GENERAL: PAINLEVEL_OUTOF10: 5

## 2025-04-08 ASSESSMENT — PAIN DESCRIPTION - DESCRIPTORS: DESCRIPTORS: TIGHTNESS;SHARP

## 2025-04-08 NOTE — PLAN OF CARE
[facility]  PHYSICAL THERAPY PLAN OF CARE                                    [facility] [address] [address]     Ph: [phone]  Fax: [phone]    [] Certification  [x] Recertification []  Plan of Care  [x] Progress Note [] Discharge      Referring Provider: Garcia Gomez DO    From:  Nereyda Crowder PT, DPT     Patient: Hunter Fay (59 y.o. male) : 1965 Date: 2025   Medical Diagnosis: Sprain of ligaments of cervical spine, initial encounter [S13.4XXA]  Sprain of ligaments of thoracic spine, initial encounter [S23.3XXA]  Tension-type headache, unspecified, not intractable [G44.209]    Treatment Diagnosis: Neck pain, Headaches, R shoulder radiating pain    Plan of Care/Certification Expiration Date: 25 (updated 3/21/25 per [place])   Progress Report Period from: 25  to 2025    Visits to Date: 7 No Show: 1 Cancelled Appts: 1    OBJECTIVE:     Long Term Goals - Time Frame for Long Term Goals : 4 weeks  Goals Current/ Discharge status Status   Long Term Goal 1: Patient will improve cervical ROM by >/=10-15 deg to improve functional tasks such as driving. LTG 1 Current Status:: cervical flexion 40 extension 30 L SB 30 R 32  Rotation  3/21: Rt rot = 51*, Lt Rot 45*    In progress, Partially met   Long Term Goal 2: Patient will improve R UE strength to 5/5 for improve pushing / lifting tasks at work. LTG 2 Current Status:: 25 R shoulder flexion and abduction 4+5  er/ir 5/5 elbow flexion and extension 5/5   In progress, Partially met   Long Term Goal 3: Patient will improve R shoulder elevation to WFL for improved work and ADL tasks. LTG 3 Current Status:: visually arom in R shoulder 100 deg flexion and abduction.   In progress   Long Term Goal 4: Patient will report avg pain of </=3/10 during functional activities and >/=25% reduction in HAs to demonstrate improved QOL. LTG 4 Current Status::   Pt reports 100% reduction in headaches; pain in neck 5/10 with work

## 2025-04-08 NOTE — PROGRESS NOTES
[facility]  Outpatient Physical Therapy   Treatment Note        Date: 2025  Patient: Hunter Fay  : 1965   Confirmed: Yes  MRN: [id number]  Referring Provider: Garcia Gomez DO      Medical Diagnosis: Sprain of ligaments of cervical spine, initial encounter [S13.4XXA]  Sprain of ligaments of thoracic spine, initial encounter [S23.3XXA]  Tension-type headache, unspecified, not intractable [G44.209]      Treatment Diagnosis: Neck pain, Headaches, R shoulder radiating pain    Visit Information:  Insurance: Payor: [employer] / Plan: [employer] / Product Type: *No Product type* /   PT Visit Information  PT Insurance Information: [facility]#[id number]  Total # of Visits Approved: 60 (60 units per CPT code)  Total # of Visits to Date: 7  Plan of Care/Certification Expiration Date: 25 (updated 3/21/25 per [place])  No Show: 1  Canceled Appointment: 1  Progress Note Counter: NECK:  ;  (Manual , TE 15/60, Estim )    Subjective Information:  Subjective: Pt reports that pain increases with work everyday. Pt reports that therapy is helping.  HEP Compliance:  [] Good [] Fair [x] Poor [x] Reports not doing due to: working then going to sleep        Pain Screening  Patient Currently in Pain: Yes  Pain Assessment: 0-10  Pain Level: 5  Pain Type: Chronic pain  Pain Location: Neck  Pain Orientation: Right  Pain Descriptors: Tightness, Sharp    Treatment:  Exercises:  Exercises  Exercise 1: UBE L2.0 F/R x4 min total  Exercise 3: B UT/Levator str 20sec holds x3 reps, ea  Exercise 7: SCM ~20sec x 3 reps, sharita  Exercise 8: cervcial isometrics: flex, ext, sbing 10 reps x 3-5 sec holds       Manual:    STM,sub occ release trigger point release to cervical musculature. 8 min        Modalities:  Moist Heat (CPT 05214)  Patient Position: Seated  Number Minutes Moist Heat: 10  Moist heat location: Cervical, Shoulder  Post treatment skin assessment: Redness - no adverse

## 2025-05-01 ENCOUNTER — HOSPITAL ENCOUNTER (OUTPATIENT)
Dept: PHYSICAL THERAPY | Age: 60
Setting detail: THERAPIES SERIES
Discharge: HOME | End: 2025-05-01
Payer: OTHER GOVERNMENT

## 2025-05-01 PROCEDURE — 97110 THERAPEUTIC EXERCISES: CPT

## 2025-05-01 PROCEDURE — G0283 ELEC STIM OTHER THAN WOUND: HCPCS

## 2025-05-01 NOTE — PROGRESS NOTES
[facility]  Outpatient Physical Therapy    Treatment Note        Date: 2025  Patient: Hunter Fay  : 1965   Confirmed: Yes  MRN: [id number]  Referring Provider: Garcia Gomez DO    Medical Diagnosis: Sprain of ligaments of cervical spine, initial encounter [S13.4XXA]  Sprain of ligaments of thoracic spine, initial encounter [S23.3XXA]  Tension-type headache, unspecified, not intractable [G44.209]       Treatment Diagnosis: Neck pain, Headaches, R shoulder radiating pain    Visit Information:  Insurance: Payor: [employer] / Plan: [employer] / Product Type: *No Product type* /   PT Visit Information  PT Insurance Information: [facility]#[id number]  Total # of Visits Approved: 60 (60 units per CPT code)  Total # of Visits to Date: 8  Plan of Care/Certification Expiration Date: 25 (updated 3/21/25 per [place])  No Show: 1  Canceled Appointment: 1  Progress Note Counter: NECK:   ;  (Manual , TE 15/60, Estim )    Subjective Information:  Subjective: Patient arrived to scheduled appt after 3 week lapse due to awaiting insurance approval and having a busy work schedule. He denies change in symptoms and reports poor compliance with HEP due to working 10 hour shifts.  HEP Compliance:  [] Good [] Fair [x] Poor [] Reports not doing due to:    Pain Screening  Patient Currently in Pain: Denies- c/o muscle stiffness/tightness     Treatment:  Exercises:  Exercises  Exercise 1: UBE L3.0 F/R x5 min total  Exercise 2: cervical retraction 15 reps x 3-5 sec holds  Exercise 3: B UT/Levator str 20sec holds x3 reps, ea  Exercise 4: cervical extension, rotation, extension w/ Sbing with towel 5\"x10 ea B  Exercise 7: SCM ~20sec x 3 reps, sharita    Modalities:  Moist Heat (CPT 82653)  Patient Position: Seated  Number Minutes Moist Heat: 10  Moist heat location: Cervical, Shoulder  Post treatment skin assessment: Redness - no adverse reaction  Limitations addressed: Pain modulation,

## 2025-05-02 ENCOUNTER — APPOINTMENT (OUTPATIENT)
Dept: PHYSICAL THERAPY | Age: 60
End: 2025-05-02
Payer: OTHER GOVERNMENT

## 2025-05-06 ENCOUNTER — HOSPITAL ENCOUNTER (OUTPATIENT)
Dept: PHYSICAL THERAPY | Age: 60
Setting detail: THERAPIES SERIES
Discharge: HOME | End: 2025-05-06
Payer: OTHER GOVERNMENT

## 2025-05-06 PROCEDURE — 97110 THERAPEUTIC EXERCISES: CPT

## 2025-05-06 NOTE — PROGRESS NOTES
[facility]  Outpatient Physical Therapy    Treatment Note        Date: 2025  Patient: Hunter Fay  : 1965   Confirmed: Yes  MRN: [id number]  Referring Provider: Garcia Gomez DO    Medical Diagnosis: Sprain of ligaments of cervical spine, initial encounter [S13.4XXA]  Sprain of ligaments of thoracic spine, initial encounter [S23.3XXA]  Tension-type headache, unspecified, not intractable [G44.209]       Treatment Diagnosis: Neck pain, Headaches, R shoulder radiating pain    Visit Information:  Insurance: Payor: [employer]MENT [employer] OWCP / Plan: [employer] OWCP / Product Type: *No Product type* /   PT Visit Information  PT Insurance Information: [facility]#[id number]  Total # of Visits Approved: 60 (60 units per CPT code)  Total # of Visits to Date: 10  Plan of Care/Certification Expiration Date: 25 (updated 3/21/25 per [place])  No Show: 1  Canceled Appointment: 1  Progress Note Counter: NECK: 3/12  ;  (Manual , TE , Estim )    Subjective Information:  Subjective: Patient denies HAs or muscle spasms x2-3 weeks.  HEP Compliance:  [] Good [] Fair [x] Poor [] Reports not doing due to:    Pain Screening  Patient Currently in Pain: Denies    Treatment:  Exercises:  Exercises  Exercise 1: UBE L4.0 F/R x5 min total  Exercise 2: cervical retraction 15 reps x 3-5 sec holds  Exercise 3: B UT/Levator str 20sec holds x3 reps, ea  Exercise 4: cervical extension, rotation, extension w/ Sbing with towel 5\"x10 ea B  Exercise 5: standing cervical retractions into ball 5\"x10; standing cervical extension isos into ball 5\"x10  Exercise 7: SCM ~20sec x 3 reps, sharita  Exercise 20: HEP: continue current     Modalities: Held modalities due to no pain or spasms this date.     *Indicates exercise, modality, or manual techniques to be initiated when appropriate    Objective Measures:     LTG 3 Current Status:: Right shoulder flexion 150 degrees, Left 155 degrees  LTG 4 Current Status:: : patient

## 2025-05-09 ENCOUNTER — HOSPITAL ENCOUNTER (OUTPATIENT)
Dept: PHYSICAL THERAPY | Age: 60
Setting detail: THERAPIES SERIES
Discharge: HOME | End: 2025-05-09
Payer: OTHER GOVERNMENT

## 2025-05-09 PROCEDURE — 97110 THERAPEUTIC EXERCISES: CPT

## 2025-05-09 NOTE — PROGRESS NOTES
[facility]  PHYSICAL THERAPY PLAN OF CARE                                    [facility] [place] [address]     Ph: [phone]  Fax: [phone]      [] Certification  [] Recertification []  Plan of Care  [x] Progress Note [] Discharge      Referring Provider: Garcia Gomez DO     From:  Nereyda Crowder PT, DPT   Patient: Hunter Fay (59 y.o. male) : 1965 Date: 2025  Medical Diagnosis: Sprain of ligaments of cervical spine, initial encounter [S13.4XXA]  Sprain of ligaments of thoracic spine, initial encounter [S23.3XXA]  Tension-type headache, unspecified, not intractable [G44.209]       Treatment Diagnosis: Neck pain, Headaches, R shoulder radiating pain    Plan of Care/Certification Expiration Date: : 25   Progress Report Period from:  2025 to 2025    Visits to Date: 11 No Show: 1 Cancelled Appts: 1    OBJECTIVE:     Long Term Goals - Time Frame for Long Term Goals : 4 weeks  Goals Current/ Discharge status Status   Long Term Goal 1: Patient will improve cervical ROM by >/=10-15 deg to improve functional tasks such as driving. : Cervical flexion 50  Cervical extension 40  Cervical R SB 34  Cervical L SB 35   Cervical rotation R 54   Cervical rotation L 50 In progress, Partially met, Met   Long Term Goal 2: Patient will improve R UE strength to 5/5 for improve pushing / lifting tasks at work. : Strength RUE  Strength RUE: Exception  R Shoulder Flexion: 4+/5  R Shoulder Extension: 4+/5  R Shoulder ABduction: 4+/5  R Shoulder Internal Rotation: 5/5  R Shoulder External Rotation: 5/5   In progress, Partially met   Long Term Goal 3: Patient will improve R shoulder elevation to WFL for improved work and ADL tasks. : 150 degrees  Met   Long Term Goal 4: Patient will report avg pain of </=3/10 during functional activities and >/=25% reduction in HAs to demonstrate improved QOL. : Scapular pain 0-4/10; patient denies HAs x3 weeks  In progress, Partially met   Long 
Rotation: 5/5  R Shoulder External Rotation: 5/5    ROM: [] NT  [] ROM measurements:    AROM Cervical Spine   Cervical flexion: 50  Cervical extension: 40  Cervical right lateral: 34  Cervical left lateral: 35  Cervical right rotation: 54  Cervical left rotation: 50     LTG 3 Current Status:: Right shoulder flexion 150 degrees, Left 155 degrees  LTG 4 Current Status:: 5/6: patient denies HAs x2-3 weeks       Assessment:   Body Structures, Functions, Activity Limitations Requiring Skilled Therapeutic Intervention: Decreased functional mobility , Decreased ROM, Decreased body mechanics, Decreased tolerance to work activity, Decreased strength, Increased pain, Decreased posture  Assessment: Objective measurements assessed this date to determine progress towards goals. He has met or partially met all goals. He is most limited with cervical SBing AROM. No signifcant change in since initial evaluaiton. Patient self reports he is able to do all his work duties however they sometimes cause increased pain.  Treatment Diagnosis: Neck pain, Headaches, R shoulder radiating pain  Therapy Prognosis: Good, Fair    Post-Pain Assessment:       Pain Rating (0-10 pain scale):   0/10   Location and pain description same as pre-treatment unless indicated.   Action: [x] NA   [] Perform HEP  [] Meds as prescribed  [] Modalities as prescribed   [] Call Physician     GOALS   Patient Goal(s): Patient Goals : Relief from neck muscle spasms and pain    Long Term Goals Completed by 4 weeks Goal Status   LTG 1 Patient will improve cervical ROM by >/=10-15 deg to improve functional tasks such as driving. In progress, Partially met   LTG 2 Patient will improve R UE strength to 5/5 for improve pushing / lifting tasks at work. In progress, Partially met   LTG 3 Patient will improve R shoulder elevation to WFL for improved work and ADL tasks. Met   LTG 4 Patient will report avg pain of </=3/10 during functional activities and >/=25% reduction in HAs

## 2025-05-12 ENCOUNTER — HOSPITAL ENCOUNTER (OUTPATIENT)
Dept: PHYSICAL THERAPY | Age: 60
Setting detail: THERAPIES SERIES
End: 2025-05-12
Payer: OTHER GOVERNMENT

## 2025-05-16 ENCOUNTER — APPOINTMENT (OUTPATIENT)
Dept: PHYSICAL THERAPY | Age: 60
End: 2025-05-16
Payer: OTHER GOVERNMENT

## 2025-05-19 ENCOUNTER — APPOINTMENT (OUTPATIENT)
Dept: PHYSICAL THERAPY | Age: 60
End: 2025-05-19
Payer: OTHER GOVERNMENT

## 2025-05-19 ENCOUNTER — HOSPITAL ENCOUNTER (OUTPATIENT)
Dept: PHYSICAL THERAPY | Age: 60
Setting detail: THERAPIES SERIES
Discharge: HOME | End: 2025-05-19
Payer: COMMERCIAL

## 2025-05-19 PROCEDURE — 97140 MANUAL THERAPY 1/> REGIONS: CPT

## 2025-05-19 PROCEDURE — 97110 THERAPEUTIC EXERCISES: CPT

## 2025-05-19 NOTE — PROGRESS NOTES
[facility]  [address] [facility] [place] [address]  Phone: [phone]    Date: 2025  Patient: Hunter Fay  : 1965   Confirmed: Yes  MRN: [id number]  Referring Provider: No ref. provider found    Medical Diagnosis: No admission diagnoses are documented for this encounter.       Treatment Diagnosis: Neck pain, R shoulder radiating pain    Visit Information:  Insurance: Payor: [employer] / Plan: [employer] / Product Type: *No Product type* /   PT Visit Information  PT Insurance Information: [facility]#[id number]  Total # of Visits Approved: 12 (60 units per CPT code)  Total # of Visits to Date: 11  Plan of Care/Certification Expiration Date: 25  No Show: 1  Canceled Appointment: 1  Progress Note Counter: NECK: ;  (Manual 5**/60, TE 26**/60, Estim 8**/60)    Subjective Information:  Subjective: Pt transfers over from [facility] to [facility] and today is his first day.  He has been on hold for last 2 weeks.  Pt reports he enjoyed his manual therapy from [place].  Pt reports he has some tension in his neck but no c/o pain.  Pt reports he has been compensating at work to avoid further hurting his shoulder.  He reports he avoids pulling stuff and pushes things differently.  HEP Compliance:  [x] Good [] Fair [] Poor [] Reports not doing due to:    Pain Screening  Patient Currently in Pain: Denies    Treatment:  Exercises:  Exercises  Exercise 19: Goals and outcomes assessed  Exercise 20: HEP: continue current       Manual:   Manual Therapy  Joint Mobilization: Grade V mobilizations thoracic spine x 7 min  Soft Tissue Mobilizaton: STM R UT, levator scap, SCM x 11 min  Other: total manual time: 18 min       *Indicates exercise, modality, or manual techniques to be initiated when appropriate    Objective Measures:     LTG 1 Current Status:: cervical flexion 30 (tension) extension 30 (painful) L SB 30 (painful) R 25, LR: 57 RR: 49 (painful)  LTG 2 Current Status:: 25 R shoulder

## 2025-05-19 NOTE — PROGRESS NOTES
[facility]  [address] [place] [address]  Phone: [phone]    [] Certification  [] Recertification []  Plan of Care  [x] Progress Note [] Discharge      Referring Provider: No ref. provider found     From:  Sheila Benitez, PT,DPT  Patient: Hunter Fay (59 y.o. male) : 1965 Date: 2025  Medical Diagnosis: No admission diagnoses are documented for this encounter.       Treatment Diagnosis: Neck pain, R shoulder radiating pain    Plan of Care/Certification Expiration Date: 25   Progress Report Period from:  2025  to 2025    Visits to Date: 11 No Show: 1 Cancelled Appts: 1    OBJECTIVE:     Long Term Goals - Time Frame for Long Term Goals : 4 weeks  Goals Current/ Discharge status Status   Long Term Goal 1: Patient will improve cervical ROM by >/=10-15 deg to improve functional tasks such as driving. LTG 1 Current Status:: cervical flexion 30 (tension) extension 30 (painful) L SB 30 (painful) R 25, LR: 57 RR: 49 (painful)   In progress   Long Term Goal 2: Patient will improve R UE strength to 5/5 for improve pushing / lifting tasks at work. LTG 2 Current Status:: 25 R shoulder flexion 4-/5, painful and abduction 4+5, painful  er/ir 5/5 elbow flexion and extension 5/5   In progress   Long Term Goal 3: Patient will improve R shoulder elevation to WFL for improved work and ADL tasks. LTG 3 Current Status:: Right shoulder flexion 152 degrees (painful), Left 155 degrees   Partially met, In progress   Long Term Goal 4: Patient will report avg pain of </=3/10 during functional activities LTG 4 Current Status:: : 3-4/10 average, Pain ranges from 0-8/10   In progress, Updated   Long Term Goal 5: Patient will improve NDI by </= 12/50 points for improved subjective function. LTG 5 Current Status:: 2025: NDI 20/50   In progress, Updated   Long term goal 6: The pt will be independent/compliant with PT HEP in order to demonstrate

## 2025-05-23 ENCOUNTER — APPOINTMENT (OUTPATIENT)
Dept: PHYSICAL THERAPY | Age: 60
End: 2025-05-23
Payer: OTHER GOVERNMENT

## 2025-05-23 ENCOUNTER — HOSPITAL ENCOUNTER (OUTPATIENT)
Dept: PHYSICAL THERAPY | Age: 60
Setting detail: THERAPIES SERIES
Discharge: HOME | End: 2025-05-23
Payer: COMMERCIAL

## 2025-05-23 PROCEDURE — 97140 MANUAL THERAPY 1/> REGIONS: CPT

## 2025-05-23 PROCEDURE — 97110 THERAPEUTIC EXERCISES: CPT

## 2025-05-23 NOTE — PROGRESS NOTES
[address] [place] [address]  Phone: [phone]      Date: 2025  Patient: Hunter Fay  : 1965   Confirmed: Yes  MRN: [id number]  Referring Provider: No ref. provider found    Medical Diagnosis: No admission diagnoses are documented for this encounter.       Treatment Diagnosis: Neck pain, R shoulder radiating pain    Visit Information:  Insurance: Payor: [employer] / Plan: [employer] / Product Type: *No Product type* /   PT Visit Information  PT Insurance Information: [facility]#[id number]  Total # of Visits Approved: 12 (60 units per CPT code)  Total # of Visits to Date: 12  Plan of Care/Certification Expiration Date: 25  No Show: 1  Canceled Appointment: 1  Progress Note Counter: NECK:   ;  (Manual 6**/60, TE 28**/60, Estim 8**/60)    Subjective Information:  Subjective: Pt reports he is feeling okay today.  He reports he felt fantastic following his last visit.  He says he had some soreness later in the day but felt better.  He feels more mobile in his cervical spine following his manual therapy from last visit.  HEP Compliance:  [] Good [x] Fair [] Poor [] Reports not doing due to:    Pain Screening  Patient Currently in Pain: Denies    Treatment:  Exercises:  Exercises  Exercise 3: B UT/Levator str 30sec holds x3 reps, ea  Exercise 4: Rows/Extensions x10 GTB  Exercise 5: Shoulder flx/abd x10 ea, 2lbs       Manual:   Manual Therapy  Joint Mobilization: Grade II-III mobilizations cervcal x 6 min  Soft Tissue Mobilizaton: STM R UT, levator scap, SCM x 5 min  Other: total manual time: 11 min         *Indicates exercise, modality, or manual techniques to be initiated when appropriate    Objective Measures:      NT    Assessment:   Body Structures, Functions, Activity Limitations Requiring Skilled Therapeutic Intervention: Decreased functional mobility , Decreased ROM, Decreased body mechanics, Decreased tolerance to work activity, Decreased strength,

## 2025-05-30 ENCOUNTER — HOSPITAL ENCOUNTER (OUTPATIENT)
Dept: PHYSICAL THERAPY | Age: 60
Setting detail: THERAPIES SERIES
Discharge: HOME | End: 2025-05-30
Payer: COMMERCIAL

## 2025-05-30 PROCEDURE — 97110 THERAPEUTIC EXERCISES: CPT

## 2025-05-30 PROCEDURE — 97140 MANUAL THERAPY 1/> REGIONS: CPT

## 2025-05-30 ASSESSMENT — PAIN DESCRIPTION - ORIENTATION: ORIENTATION: RIGHT

## 2025-05-30 ASSESSMENT — PAIN DESCRIPTION - LOCATION: LOCATION: NECK

## 2025-05-30 ASSESSMENT — PAIN DESCRIPTION - PAIN TYPE: TYPE: CHRONIC PAIN

## 2025-05-30 ASSESSMENT — PAIN DESCRIPTION - DESCRIPTORS: DESCRIPTORS: DISCOMFORT

## 2025-05-30 NOTE — PROGRESS NOTES
Decreased posture  Assessment: Pt works further on strengthening in cervical and BUE with good tolerance to exercise with c/o increased soreness in BUE.  HE feels weaker in LUE.  Pt also continues PA mobs and feels improved cervical mobility following completion.  He reports pain as 3/10 at end of treatment with c/o tenderness but states he feels better than when he came in.  Treatment Diagnosis: Neck pain, R shoulder radiating pain  Therapy Prognosis: Good, Fair       Patient Education: [x] NA       Post-Pain Assessment:       Pain Rating (0-10 pain scale):   3/10   Location and pain description same as pre-treatment unless indicated.   Action: [] NA   [x] Perform HEP  [] Meds as prescribed  [] Modalities as prescribed   [] Call Physician     GOALS   Patient Goal(s): Patient Goals : Relieve neck and shoulder pain    Long Term Goals Completed by 4 weeks Goal Status   LTG 1 Patient will improve cervical ROM by >/=10-15 deg to improve functional tasks such as driving. In progress   LTG 2 Patient will improve R UE strength to 5/5 for improve pushing / lifting tasks at work. In progress   LTG 3 Patient will improve R shoulder elevation to WFL for improved work and ADL tasks. Partially met, In progress   LTG 4 Patient will report avg pain of </=3/10 during functional activities In progress, Updated   LTG 5 Patient will improve NDI by </= 12/50 points for improved subjective function. In progress, Updated   LTG 6 The pt will be independent/compliant with PT HEP in order to demonstrate self management of symptoms upon D/C In progress          Plan:  Frequency/Duration:  Plan  Plan Frequency: 2  Plan weeks: 4  Current Treatment Recommendations: Strengthening, ROM, Neuromuscular re-education, Manual, Pain management, Return to work related activity, Home exercise program, Safety education & training, Patient/Caregiver education & training, Modalities, Dry needling, Group Therapy  Modalities: Heat/Cold, E-stim -

## 2025-06-02 ENCOUNTER — HOSPITAL ENCOUNTER (OUTPATIENT)
Dept: PHYSICAL THERAPY | Age: 60
Setting detail: THERAPIES SERIES
Discharge: HOME | End: 2025-06-02
Payer: COMMERCIAL

## 2025-06-02 ENCOUNTER — APPOINTMENT (OUTPATIENT)
Dept: PHYSICAL THERAPY | Age: 60
End: 2025-06-02
Payer: OTHER GOVERNMENT

## 2025-06-02 PROCEDURE — 97140 MANUAL THERAPY 1/> REGIONS: CPT

## 2025-06-02 PROCEDURE — 97110 THERAPEUTIC EXERCISES: CPT

## 2025-06-02 ASSESSMENT — PAIN DESCRIPTION - DESCRIPTORS: DESCRIPTORS: DISCOMFORT

## 2025-06-02 ASSESSMENT — PAIN SCALES - GENERAL: PAINLEVEL_OUTOF10: 3

## 2025-06-02 ASSESSMENT — PAIN DESCRIPTION - PAIN TYPE: TYPE: CHRONIC PAIN

## 2025-06-02 ASSESSMENT — PAIN DESCRIPTION - ORIENTATION: ORIENTATION: RIGHT

## 2025-06-02 ASSESSMENT — PAIN DESCRIPTION - LOCATION: LOCATION: NECK

## 2025-06-02 NOTE — PROGRESS NOTES
[address] [place] [address]  Phone: [phone]      Date: 2025  Patient: Hunter Fay  : 1965   Confirmed: Yes  MRN: [id number]  Referring Provider: No ref. provider found    Medical Diagnosis: No admission diagnoses are documented for this encounter.       Treatment Diagnosis: Neck pain, R shoulder radiating pain    Visit Information:  Insurance: Payor: [employer] / Plan: [employer] / Product Type: *No Product type* /   PT Visit Information  PT Insurance Information: [facility]#[id number]  Total # of Visits Approved: 12 (60 units per CPT code)  Total # of Visits to Date: 14  Plan of Care/Certification Expiration Date: 25  No Show: 1  Canceled Appointment: 1  Progress Note Counter: NECK: 3/8  ;  (Manual 8**/60, TE 32**/60, Estim 8**/60)    Subjective Information:  Subjective: Pt reports he is feeling great today. Reports some tenderness in R cervical neck muscles  HEP Compliance:  [x] Good [] Fair [] Poor [] Reports not doing due to:    Pain Screening  Patient Currently in Pain: Yes  Pain Assessment: 0-10  Pain Level: 3  Pain Type: Chronic pain  Pain Location: Neck  Pain Orientation: Right  Pain Descriptors: Discomfort    Treatment:  Exercises:  Exercises  Exercise 1: UBE L3.0 F/R x5 min total  Exercise 4: Apollo Shoulder Rows (2 plates) 2x10, Shoulder Extensions (2 plates) 2x10  Exercise 6: Standing Shoulder ER/IR 2 x 10 BUE w/ RTB  Exercise 8: DKFE 5\"x10 in supine       Manual:   Manual Therapy  Manual Traction: distraction x 8 min  Soft Tissue Mobilizaton: STM R UT, levator scap, SCM x 10 min  Other: total manual time: 18 min       *Indicates exercise, modality, or manual techniques to be initiated when appropriate    Objective Measures:      NT    Assessment:   Body Structures, Functions, Activity Limitations Requiring Skilled Therapeutic Intervention: Decreased functional mobility , Decreased ROM, Decreased body mechanics, Decreased tolerance to

## 2025-06-06 ENCOUNTER — APPOINTMENT (OUTPATIENT)
Dept: PHYSICAL THERAPY | Age: 60
End: 2025-06-06
Payer: OTHER GOVERNMENT

## 2025-06-06 NOTE — PROGRESS NOTES
Bed: B03  Expected date: 6/6/25  Expected time:   Means of arrival: [facility] (402)  Comments:  89 F  AMS  UTI? Not taking ABx  104/62  131  26  97%RA  AOx1         [facility]  Outpatient Physical Therapy    Treatment Note        Date: 2025  Patient: Hunter Fay  : 1965   Confirmed: Yes  MRN: [id number]  Referring Provider: Garcia Gomez DO    Medical Diagnosis: Sprain of ligaments of cervical spine, initial encounter [S13.4XXA]  Sprain of ligaments of thoracic spine, initial encounter [S23.3XXA]  Tension-type headache, unspecified, not intractable [G44.209]       Treatment Diagnosis: Neck pain, Headaches, R shoulder radiating pain    Visit Information:  Insurance: Payor: [employer] / Plan: [employer] / Product Type: *No Product type* /   PT Visit Information  PT Insurance Information: [facility]  Total # of Visits Approved: 60  Total # of Visits to Date: 2  Plan of Care/Certification Expiration Date: 25  No Show: 0  Canceled Appointment: 0  Progress Note Counter: (Neck)( 60 units per CPT code)Manual , TE , Estim     Subjective Information:  Subjective: Pt states more rt than lt for tightness in his neck, shoulder still hurts, ' it feels like it's blowing up\"Pt arrived late for appt, able to accomodate.  HEP Compliance:  [] Good [x] Fair [] Poor [] Reports not doing due to:    Pain Screening  Patient Currently in Pain: Yes  Pain Assessment: 0-10  Pain Level: 6  Pain Location: Neck, Shoulder  Pain Orientation: Right  Pain Descriptors: Sharp, Tightness    Treatment:  Exercises:  Exercises  Exercise 1: cervical AROM ( flex/ext x 10 /rot/ SB x5)  Exercise 2: chin tucks 5 sec x 5  Exercise 3: UT/Levator str 10-20 secs x 2 ea  Exercise 4: barrel str Low arms 5\" x 10  Exercise 5: scap retract with arms extended x 4 with increased Rt UT pain  Exercise 6: towel rotation 3\" x 5 ea  Exercise 7: SCM [id number] x 2ea  Exercise 8: shoulder post rolls / shrugs x 10 ea  Exercise 9: consider nerve glide NV**  Exercise 20: HEP: all cervcial ex's       Manual:   Manual Therapy  Manual Traction: distraction  Soft Tissue

## 2025-06-09 ENCOUNTER — APPOINTMENT (OUTPATIENT)
Dept: PHYSICAL THERAPY | Age: 60
End: 2025-06-09
Payer: OTHER GOVERNMENT

## 2025-06-13 ENCOUNTER — APPOINTMENT (OUTPATIENT)
Dept: PHYSICAL THERAPY | Age: 60
End: 2025-06-13
Payer: OTHER GOVERNMENT

## 2025-06-13 ENCOUNTER — HOSPITAL ENCOUNTER (OUTPATIENT)
Dept: PHYSICAL THERAPY | Age: 60
Setting detail: THERAPIES SERIES
Discharge: HOME | End: 2025-06-13
Payer: OTHER GOVERNMENT

## 2025-06-13 ASSESSMENT — PAIN DESCRIPTION - ORIENTATION: ORIENTATION: RIGHT

## 2025-06-13 ASSESSMENT — PAIN DESCRIPTION - LOCATION: LOCATION: NECK

## 2025-06-13 ASSESSMENT — PAIN SCALES - GENERAL: PAINLEVEL_OUTOF10: 4

## 2025-06-13 ASSESSMENT — PAIN DESCRIPTION - PAIN TYPE: TYPE: CHRONIC PAIN

## 2025-06-13 ASSESSMENT — PAIN DESCRIPTION - DESCRIPTORS: DESCRIPTORS: TIGHTNESS;SORE

## 2025-06-13 NOTE — PROGRESS NOTES
[address] [place] [address]  Phone: [phone]      Date: 2025  Patient: Hunter Fay  : 1965   Confirmed: Yes  MRN: [id number]  Referring Provider: No ref. provider found    Medical Diagnosis: No admission diagnoses are documented for this encounter.       Treatment Diagnosis: Neck pain, R shoulder radiating pain    Visit Information:  Insurance: Payor: [employer] / Plan: [employer] / Product Type: *No Product type* /   PT Visit Information  PT Insurance Information: [facility]#[id number]  Total # of Visits Approved: 12 (60 units per CPT code)  Total # of Visits to Date: 15  Plan of Care/Certification Expiration Date: 25  No Show: 1  Canceled Appointment: 1  Progress Note Counter: NECK:   ;  (Manual 10/60, TE , Estim )    Subjective Information:  Subjective: I've gotten some muscle spasms in the neck this week. the neck is sore on the Right side mostly.  HEP Compliance:  [] Good [x] Fair [] Poor [] Reports not doing due to:    Pain Screening  Patient Currently in Pain: Yes  Pain Assessment: 0-10  Pain Level: 4  Pain Type: Chronic pain  Pain Location: Neck  Pain Orientation: Right  Pain Descriptors: Tightness, Sore    Treatment:  Exercises:  Exercises  Exercise 2: Towel roll supine Thoracic Ext w/ B UE reach 5\" x 10  Exercise 3: Towel Roll Thoracic stretch       Manual:   Manual Therapy  Joint Mobilization: Grade II-III-IV  mobilizations cervcal and upper thoracic  Manual Traction: cervical pulls 15-20 sec holds 5-10 sec rest x 5 min.  Soft Tissue Mobilizaton: STM R UT, levator scap, SCM w/ TPR performed with PROM of cervical SB and Rotation  Other: total manual time: 23 min       Objective Measures:             Assessment:   Body Structures, Functions, Activity Limitations Requiring Skilled Therapeutic Intervention: Decreased functional mobility , Decreased ROM, Decreased body mechanics, Decreased tolerance to work

## 2025-06-16 ENCOUNTER — APPOINTMENT (OUTPATIENT)
Dept: PHYSICAL THERAPY | Age: 60
End: 2025-06-16
Payer: OTHER GOVERNMENT

## 2025-06-16 ENCOUNTER — HOSPITAL ENCOUNTER (OUTPATIENT)
Dept: PHYSICAL THERAPY | Age: 60
Setting detail: THERAPIES SERIES
Discharge: HOME | End: 2025-06-16
Payer: OTHER GOVERNMENT

## 2025-06-16 PROCEDURE — 97110 THERAPEUTIC EXERCISES: CPT

## 2025-06-16 ASSESSMENT — PAIN DESCRIPTION - DESCRIPTORS: DESCRIPTORS: TIGHTNESS;DISCOMFORT

## 2025-06-16 ASSESSMENT — PAIN DESCRIPTION - ORIENTATION: ORIENTATION: RIGHT

## 2025-06-16 ASSESSMENT — PAIN SCALES - GENERAL: PAINLEVEL_OUTOF10: 3

## 2025-06-16 ASSESSMENT — PAIN DESCRIPTION - LOCATION: LOCATION: NECK

## 2025-06-16 ASSESSMENT — PAIN DESCRIPTION - PAIN TYPE: TYPE: CHRONIC PAIN

## 2025-06-16 NOTE — PROGRESS NOTES
[address] [place] [address]  Phone: [phone]      Date: 2025  Patient: Hunter Fay  : 1965   Confirmed: Yes  MRN: [id number]  Referring Provider: No ref. provider found    Medical Diagnosis: No admission diagnoses are documented for this encounter.       Treatment Diagnosis: Neck pain, R shoulder radiating pain    Visit Information:  Insurance: Payor: [employer] / Plan: [employer] / Product Type: *No Product type* /   PT Visit Information  PT Insurance Information: [facility]#[id number]  Total # of Visits Approved: 12 (60 units per CPT code)  Total # of Visits to Date: 16  Plan of Care/Certification Expiration Date: 25  No Show: 1  Canceled Appointment: 1  Progress Note Counter: NECK:   ;  (Manual 10**/60, TE 36**/60, Estim 8**/60)    Subjective Information:  Subjective: Pt reports feeling well today, reports a little tightness in the neck for last couple days.  HEP Compliance:  [] Good [x] Fair [] Poor [] Reports not doing due to:    Pain Screening  Patient Currently in Pain: Yes  Pain Assessment: 0-10  Pain Level: 3  Pain Type: Chronic pain  Pain Location: Neck  Pain Orientation: Right  Pain Descriptors: Tightness, Discomfort    Treatment:  Exercises:  Exercises  Exercise 1: UBE L3.0 F/R x5 min total  Exercise 19: Goals and outcomes assessed  Exercise 20: HEP: continue current         *Indicates exercise, modality, or manual techniques to be initiated when appropriate    Objective Measures:     LTG 1 Current Status:: 25: Cervical flexion 60 (painful), extension 40, L SB 30 (painful) R 25, LR: 52 RR: 42 (tight)  LTG 2 Current Status:: 25: R shoulder flexion 5/5, Abduction 5/5, er/ir 5/5 elbow flexion and extension 5/5  LTG 3 Current Status:: 25: Right shoulder flexion 160 degrees, Left 163 degrees  LTG 4 Current Status:: 25: Pain is normally 4-5/10 while completing ADLs, it ranges from 2/10 to to 5/10 with

## 2025-06-16 NOTE — PROGRESS NOTES
[facility]  [address] [facility] [place] [address]  Phone: [phone]    [] Certification  [] Recertification []  Plan of Care  [x] Progress Note [] Discharge      Referring Provider: No ref. provider found     From:  Sheila Benitez, PT,DPT  Patient: Hunter Fay (59 y.o. male) : 1965 Date: 2025  Medical Diagnosis: No admission diagnoses are documented for this encounter.       Treatment Diagnosis: Neck pain, R shoulder radiating pain    Plan of Care/Certification Expiration Date: 25   Progress Report Period from:  2025  to 2025    Visits to Date: 16 No Show: 1 Cancelled Appts: 1    OBJECTIVE:    Long Term Goals - Time Frame for Long Term Goals : 4 weeks  Goals Current/ Discharge status Status   Long Term Goal 1: Patient will improve cervical ROM by >/=10-15 deg to improve functional tasks such as driving. LTG 1 Current Status:: 25: Cervical flexion 60 (painful), extension 40, L SB 30 (painful) R 25, LR: 52 RR: 42 (tight)   In progress, Partially met   Long Term Goal 2: Patient will improve R UE strength to 5/5 for improve pushing / lifting tasks at work. LTG 2 Current Status:: 25: R shoulder flexion 5/5, Abduction 5/5, er/ir 5/5 elbow flexion and extension 5/5   Met   Long Term Goal 3: Patient will improve R shoulder elevation to WFL for improved work and ADL tasks. LTG 3 Current Status:: 25: Right shoulder flexion 160 degrees, Left 163 degrees   Partially met, In progress   Long Term Goal 4: Patient will report avg pain of </=3/10 during functional activities LTG 4 Current Status:: 25: Pain is normally 4-5/10 while completing ADLs, it ranges from 2/10 to to 5/10 with ADLs.   In progress   Long Term Goal 5: Patient will improve NDI by </= 12/50 points for improved subjective function. LTG 5 Current Status:: 25: NDI 15/50 (+5)   In progress   Long term goal 6: The pt will be independent/compliant with PT HEP

## 2025-06-20 ENCOUNTER — HOSPITAL ENCOUNTER (OUTPATIENT)
Dept: PHYSICAL THERAPY | Age: 60
Setting detail: THERAPIES SERIES
Discharge: HOME | End: 2025-06-20
Payer: OTHER GOVERNMENT

## 2025-06-20 ENCOUNTER — APPOINTMENT (OUTPATIENT)
Dept: PHYSICAL THERAPY | Age: 60
End: 2025-06-20
Payer: OTHER GOVERNMENT

## 2025-06-20 PROCEDURE — 97140 MANUAL THERAPY 1/> REGIONS: CPT

## 2025-06-20 PROCEDURE — 97110 THERAPEUTIC EXERCISES: CPT

## 2025-06-20 ASSESSMENT — PAIN DESCRIPTION - DESCRIPTORS: DESCRIPTORS: TIGHTNESS;DISCOMFORT

## 2025-06-20 ASSESSMENT — PAIN DESCRIPTION - ORIENTATION: ORIENTATION: RIGHT

## 2025-06-20 ASSESSMENT — PAIN SCALES - GENERAL: PAINLEVEL_OUTOF10: 6

## 2025-06-20 ASSESSMENT — PAIN DESCRIPTION - LOCATION: LOCATION: NECK;SHOULDER

## 2025-06-20 NOTE — PROGRESS NOTES
Activity Limitations Requiring Skilled Therapeutic Intervention: Decreased functional mobility , Decreased ROM, Decreased body mechanics, Decreased tolerance to work activity, Decreased strength, Increased pain, Decreased posture  Assessment: Pt reports he has increased pain in R cervical spine and thoracic spine today.  Due to this, treatment is focuses more on manual therapy to help decrease pain and stiffness in that area of body.  Pt iniitally feels improvement in mobility and decrease in pain for 6/10 to 4/10 with cervical and thoracic mobs.  He decreases to 1/10 following STM to R neck.  Pt also continues working on exercises that help improve thoracic spine extension mobility and cervical muscle stability.  Continue PT POC.  Treatment Diagnosis: Neck pain, R shoulder radiating pain  Therapy Prognosis: Good, Fair       Patient Education: [x] NA       Post-Pain Assessment:       Pain Rating (0-10 pain scale):   1/10   Location and pain description same as pre-treatment unless indicated.   Action: [] NA   [x] Perform HEP  [] Meds as prescribed  [] Modalities as prescribed   [] Call Physician     GOALS   Patient Goal(s): Patient Goals : Relieve neck and shoulder pain    Long Term Goals Completed by 4 weeks Goal Status   LTG 1 Patient will improve cervical ROM by >/=10-15 deg to improve functional tasks such as driving. In progress, Partially met   LTG 2 Patient will improve R UE strength to 5/5 for improve pushing / lifting tasks at work. Met   LTG 3 Patient will improve R shoulder elevation to WFL for improved work and ADL tasks. Partially met, In progress   LTG 4 Patient will report avg pain of </=3/10 during functional activities In progress   LTG 5 Patient will improve NDI by </= 12/50 points for improved subjective function. In progress   LTG 6 The pt will be independent/compliant with PT HEP in order to demonstrate self management of symptoms upon D/C In progress

## 2025-07-11 ENCOUNTER — HOSPITAL ENCOUNTER (OUTPATIENT)
Dept: PHYSICAL THERAPY | Age: 60
Setting detail: THERAPIES SERIES
Discharge: HOME | End: 2025-07-11
Payer: OTHER GOVERNMENT

## 2025-07-11 PROCEDURE — 97110 THERAPEUTIC EXERCISES: CPT

## 2025-07-11 ASSESSMENT — PAIN DESCRIPTION - LOCATION: LOCATION: NECK

## 2025-07-11 ASSESSMENT — PAIN DESCRIPTION - PAIN TYPE: TYPE: CHRONIC PAIN

## 2025-07-11 ASSESSMENT — PAIN SCALES - GENERAL: PAINLEVEL_OUTOF10: 5

## 2025-07-11 ASSESSMENT — PAIN DESCRIPTION - DESCRIPTORS: DESCRIPTORS: TIGHTNESS;DISCOMFORT

## 2025-07-11 ASSESSMENT — PAIN DESCRIPTION - ORIENTATION: ORIENTATION: RIGHT

## 2025-07-11 NOTE — PROGRESS NOTES
roll  Exercise 20: HEP: continue current       Objective Measures:                LTG 1 Current Status:: 7/11/25: Cervical flexion 45 (tight), extension 40, L SB 35 (painful) R 35, LR: 60 RR: 60 (tight)             Assessment:   Body Structures, Functions, Activity Limitations Requiring Skilled Therapeutic Intervention: Decreased functional mobility , Decreased ROM, Decreased body mechanics, Decreased tolerance to work activity, Decreased strength, Increased pain, Decreased posture  Assessment: Pt with improved Cervical AROM measurements today at start of tx session with exception of cervical flexion where pt displayed a decrease in ROM with reported tightness limiting current range.  Treatment Diagnosis: Neck pain, R shoulder radiating pain  Therapy Prognosis: Good, Fair       Patient Education: [x] NA       Post-Pain Assessment:       Pain Rating (0-10 pain scale):   3/10   Location and pain description same as pre-treatment unless indicated.   Action: [] NA   [x] Perform HEP  [] Meds as prescribed  [] Modalities as prescribed   [] Call Physician     GOALS   Patient Goal(s): Patient Goals : Relieve neck and shoulder pain      Long Term Goals Completed by 4 weeks Goal Status   LTG 1 Patient will improve cervical ROM by >/=10-15 deg to improve functional tasks such as driving. In progress, Partially met   LTG 2 Patient will improve R UE strength to 5/5 for improve pushing / lifting tasks at work. Met   LTG 3 Patient will improve R shoulder elevation to WFL for improved work and ADL tasks. Partially met, In progress   LTG 4 Patient will report avg pain of </=3/10 during functional activities In progress   LTG 5 Patient will improve NDI by </= 12/50 points for improved subjective function. In progress   LTG 6 The pt will be independent/compliant with PT HEP in order to demonstrate self management of symptoms upon D/C In progress       Plan:  Frequency/Duration:  Plan  Plan Frequency: 2  Plan weeks: 4  Current

## 2025-07-14 ENCOUNTER — HOSPITAL ENCOUNTER (OUTPATIENT)
Dept: PHYSICAL THERAPY | Age: 60
Setting detail: THERAPIES SERIES
Discharge: HOME | End: 2025-07-14
Payer: OTHER GOVERNMENT

## 2025-07-14 PROCEDURE — 97140 MANUAL THERAPY 1/> REGIONS: CPT

## 2025-07-14 PROCEDURE — 97110 THERAPEUTIC EXERCISES: CPT

## 2025-07-14 ASSESSMENT — PAIN DESCRIPTION - PAIN TYPE: TYPE: ACUTE PAIN

## 2025-07-14 ASSESSMENT — PAIN DESCRIPTION - DESCRIPTORS: DESCRIPTORS: TIGHTNESS;DISCOMFORT

## 2025-07-14 ASSESSMENT — PAIN DESCRIPTION - LOCATION: LOCATION: NECK

## 2025-07-14 ASSESSMENT — PAIN SCALES - GENERAL: PAINLEVEL_OUTOF10: 4

## 2025-07-14 ASSESSMENT — PAIN DESCRIPTION - ORIENTATION: ORIENTATION: RIGHT

## 2025-07-14 NOTE — PROGRESS NOTES
[place] [address] [place] [address]  Phone: [phone]    Date: 2025  Patient: Hunter Fay  : 1965   Confirmed: Yes  MRN: [id number]  Referring Provider: aGrcia Gomez DO    Medical Diagnosis: Sprain of ligaments of cervical spine, initial encounter [S13.4XXA]  Sprain of ligaments of thoracic spine, initial encounter [S23.3XXA]  Tension-type headache, unspecified, not intractable [G44.209]       Treatment Diagnosis: Neck pain, R shoulder radiating pain    Visit Information:  Insurance: Payor: [employer]MENT [employer] / Plan: [employer]MENT [employer] / Product Type: *No Product type* /   PT Visit Information  PT Insurance Information: [facility]#[id number]  Total # of Visits Approved: 12 (60 units per CPT code)  Total # of Visits to Date:   Plan of Care/Certification Expiration Date: 25  No Show: 1  Canceled Appointment: 1  Progress Note Counter: NECK:   ;  (Manual 13**/60, TE 40**/60, Estim 8**/60)    Subjective Information:  Subjective: Pt reports return of R sided spasms 2 days ago.  He says he felt better with thoracic pressure following last visit.  HEP Compliance:  [x] Good [] Fair [] Poor [] Reports not doing due to:    Pain Screening  Patient Currently in Pain: Yes  Pain Assessment: 0-10  Pain Level: 4  Pain Type: Acute pain  Pain Location: Neck  Pain Orientation: Right  Pain Descriptors: Tightness, Discomfort    Treatment:  Exercises:  Exercises  Exercise 1: UBE L4.0 F/R x5 min total  Exercise 2: Towel roll supine Thoracic Ext w/ B UE reach w/ 5lb bar 5\" x 10 x 2  Exercise 3: Supine Towel Roll Thoracic/Pec  stretch 30\"x5  Exercise 4: Apollo Shoulder Rows (3 plates) 2x10, Shoulder Extensions (3 plates) 2x10  Exercise 8: DKFE 8\"x10 in supine  Exercise 20: HEP: continue current       Manual:   Manual Therapy  Joint Mobilization: Grade II-III-IV  mobilizations cervcal x 9 minutes         *Indicates exercise, modality, or manual techniques to be initiated when

## 2025-07-18 ENCOUNTER — HOSPITAL ENCOUNTER (OUTPATIENT)
Dept: PHYSICAL THERAPY | Age: 60
Setting detail: THERAPIES SERIES
Discharge: HOME | End: 2025-07-18
Payer: OTHER GOVERNMENT

## 2025-07-18 PROCEDURE — 97110 THERAPEUTIC EXERCISES: CPT

## 2025-07-18 PROCEDURE — 97140 MANUAL THERAPY 1/> REGIONS: CPT

## 2025-07-18 ASSESSMENT — PAIN DESCRIPTION - DESCRIPTORS: DESCRIPTORS: TIGHTNESS;DISCOMFORT

## 2025-07-18 ASSESSMENT — PAIN DESCRIPTION - LOCATION: LOCATION: NECK

## 2025-07-18 ASSESSMENT — PAIN DESCRIPTION - ORIENTATION: ORIENTATION: RIGHT

## 2025-07-18 ASSESSMENT — PAIN SCALES - GENERAL: PAINLEVEL_OUTOF10: 4

## 2025-07-18 NOTE — PROGRESS NOTES
[address] [place] [address]  Phone: [phone]    Date: 2025  Patient: Hunter Fay  : 1965   Confirmed: Yes  MRN: [id number]  Referring Provider: Garcia Gomez DO    Medical Diagnosis: Sprain of ligaments of cervical spine, initial encounter [S13.4XXA]  Sprain of ligaments of thoracic spine, initial encounter [S23.3XXA]  Tension-type headache, unspecified, not intractable [G44.209]       Treatment Diagnosis: Neck pain, R shoulder radiating pain    Visit Information:  Insurance: Payor: [employer] DEPARTMENT [employer] / Plan: [employer]MENT [employer] OWCP / Product Type: *No Product type* /   PT Visit Information  PT Insurance Information: [facility]#[id number]  Total # of Visits Approved: 12 (60 units per CPT code)  Total # of Visits to Date: 20  Plan of Care/Certification Expiration Date: 25  No Show: 1  Canceled Appointment: 1  Progress Note Counter: NECK:   ;  (Manual 14**/60, TE 42**/60, Estim 8**/60)    Subjective Information:  Subjective: I haven't had any muscle spasms since coming back to therapy but when I was out of therapy for a while it came back. this therapy is helping me maintain some of that relief  HEP Compliance:  [x] Good [] Fair [] Poor [] Reports not doing due to:    Pain Screening  Patient Currently in Pain: Yes  Pain Assessment: 0-10  Pain Level: 4  Pain Location: Neck  Pain Orientation: Right  Pain Descriptors: Tightness, Discomfort    Treatment:  Exercises:  Exercises  Exercise 1: UBE L4.0 F/R x5 min total  Exercise 2: Towel roll (3) supine Thoracic Ext w/ B UE reach w/ 5lb bar 5\" x 10 x 2  Exercise 3: Supine Towel Roll (3)  Thoracic/Pec  stretch 30\"x5  Exercise 4: Apollo Seated Mid Rows (4 plates, 2 hand position ) x10 ea., Shoulder Extensions (3 plates) 2x10  Exercise 5: Apollo Lat pulls (big bar) 4 plates Wide /Narrow rev.  x 10 ea.  Exercise 8: DKFE 8\"x10 in supine  Exercise 20: HEP: continue current       Manual:   Manual

## 2025-07-21 ENCOUNTER — HOSPITAL ENCOUNTER (OUTPATIENT)
Dept: PHYSICAL THERAPY | Age: 60
Setting detail: THERAPIES SERIES
Discharge: HOME | End: 2025-07-21
Payer: OTHER GOVERNMENT

## 2025-07-21 PROCEDURE — 97110 THERAPEUTIC EXERCISES: CPT

## 2025-07-21 PROCEDURE — 97140 MANUAL THERAPY 1/> REGIONS: CPT

## 2025-07-21 ASSESSMENT — PAIN DESCRIPTION - DESCRIPTORS: DESCRIPTORS: TIGHTNESS;DISCOMFORT

## 2025-07-21 ASSESSMENT — PAIN DESCRIPTION - ORIENTATION: ORIENTATION: RIGHT;LEFT

## 2025-07-21 ASSESSMENT — PAIN DESCRIPTION - PAIN TYPE: TYPE: ACUTE PAIN

## 2025-07-21 ASSESSMENT — PAIN DESCRIPTION - LOCATION: LOCATION: NECK

## 2025-07-21 ASSESSMENT — PAIN SCALES - GENERAL: PAINLEVEL_OUTOF10: 5

## 2025-07-21 NOTE — PROGRESS NOTES
total manual time: 14 min       *Indicates exercise, modality, or manual techniques to be initiated when appropriate    Objective Measures:      NT    Assessment:   Body Structures, Functions, Activity Limitations Requiring Skilled Therapeutic Intervention: Decreased functional mobility , Decreased ROM, Decreased body mechanics, Decreased tolerance to work activity, Decreased strength, Increased pain, Decreased posture  Assessment: Due to increased pain, strengthening exercises are deferred today.  Pt continues working on supine cervical and thoracic exercises to improve thoracic mobility and flexibility and increased cervical strength and ROM.  HE reinitiates cervical and thoracic mobs with good tolerance and has improved cerrvical flexion and rotation following completion.  He also reports improvement in pain level.  Treatment Diagnosis: Neck pain, R shoulder radiating pain  Therapy Prognosis: Good, Fair       Patient Education: [x] NA       Post-Pain Assessment:       Pain Rating (0-10 pain scale):   2/10   Location and pain description same as pre-treatment unless indicated.   Action: [] NA   [x] Perform HEP  [] Meds as prescribed  [] Modalities as prescribed   [] Call Physician     GOALS   Patient Goal(s): Patient Goals : Relieve neck and shoulder pain    Long Term Goals Completed by 4 weeks Goal Status   LTG 1 Patient will improve cervical ROM by >/=10-15 deg to improve functional tasks such as driving. In progress, Partially met   LTG 2 Patient will improve R UE strength to 5/5 for improve pushing / lifting tasks at work. Met   LTG 3 Patient will improve R shoulder elevation to WFL for improved work and ADL tasks. Partially met, In progress   LTG 4 Patient will report avg pain of </=3/10 during functional activities In progress   LTG 5 Patient will improve NDI by </= 12/50 points for improved subjective function. In progress   LTG 6 The pt will be independent/compliant with PT HEP in order to demonstrate self

## 2025-07-28 ENCOUNTER — HOSPITAL ENCOUNTER (OUTPATIENT)
Dept: PHYSICAL THERAPY | Age: 60
Setting detail: THERAPIES SERIES
Discharge: HOME | End: 2025-07-28
Payer: OTHER GOVERNMENT

## 2025-07-28 PROCEDURE — 97110 THERAPEUTIC EXERCISES: CPT

## 2025-07-28 NOTE — PROGRESS NOTES
work activity, Decreased strength, Increased pain, Decreased posture  Assessment: Pt reports he has felt an improvement in overal fucntion since first starting PT but reports he still lacks confidence with pushing and pulling equipment at his work site, noting he has to be more cautious.  He is able to complete sled push today with good tolerance and no increase in pain.  Pt is able to progress in other apollo UE strengthening exercises with good tolerance and no increase in pain.  Treatment Diagnosis: Neck pain, R shoulder radiating pain  Therapy Prognosis: Good, Fair       Patient Education: [x] NA       Post-Pain Assessment:       Pain Rating (0-10 pain scale):   /10   Location and pain description same as pre-treatment unless indicated.   Action: [] NA   [x] Perform HEP  [] Meds as prescribed  [] Modalities as prescribed   [] Call Physician     GOALS   Patient Goal(s): Patient Goals : Relieve neck and shoulder pain    Long Term Goals Completed by 4 weeks Goal Status   LTG 1 Patient will improve cervical ROM by >/=10-15 deg to improve functional tasks such as driving. In progress, Partially met   LTG 2 Patient will improve R UE strength to 5/5 for improve pushing / lifting tasks at work. Met   LTG 3 Patient will improve R shoulder elevation to WFL for improved work and ADL tasks. Partially met, In progress   LTG 4 Patient will report avg pain of </=3/10 during functional activities In progress   LTG 5 Patient will improve NDI by </= 12/50 points for improved subjective function. In progress   LTG 6 The pt will be independent/compliant with PT HEP in order to demonstrate self management of symptoms upon D/C In progress          Plan:  Frequency/Duration:  Plan  Plan Frequency: 2  Plan weeks: 4  Specific Instructions for Next Treatment: Plan for D/C next visit  Current Treatment Recommendations: Strengthening, ROM, Neuromuscular re-education, Manual, Pain management, Return to work related activity, Home exercise

## 2025-07-31 ENCOUNTER — HOSPITAL ENCOUNTER (OUTPATIENT)
Dept: PHYSICAL THERAPY | Age: 60
Setting detail: THERAPIES SERIES
Discharge: HOME | End: 2025-07-31
Payer: OTHER GOVERNMENT

## 2025-07-31 PROCEDURE — 97140 MANUAL THERAPY 1/> REGIONS: CPT

## 2025-07-31 PROCEDURE — 97110 THERAPEUTIC EXERCISES: CPT

## 2025-07-31 ASSESSMENT — PAIN DESCRIPTION - PAIN TYPE: TYPE: ACUTE PAIN

## 2025-07-31 ASSESSMENT — PAIN DESCRIPTION - LOCATION: LOCATION: NECK

## 2025-07-31 ASSESSMENT — PAIN DESCRIPTION - ORIENTATION: ORIENTATION: RIGHT

## 2025-07-31 ASSESSMENT — PAIN DESCRIPTION - DESCRIPTORS: DESCRIPTORS: TIGHTNESS;OTHER (COMMENT)

## 2025-07-31 ASSESSMENT — PAIN SCALES - GENERAL: PAINLEVEL_OUTOF10: 3

## 2025-07-31 NOTE — PROGRESS NOTES
[address] [place] [address]  Phone: [phone]      Date: 2025  Patient: Hunter Fay  : 1965   Confirmed: Yes  MRN: [id number]  Referring Provider: Garcia Gomez DO    Medical Diagnosis: Sprain of ligaments of cervical spine, initial encounter [S13.4XXA]  Sprain of ligaments of thoracic spine, initial encounter [S23.3XXA]  Tension-type headache, unspecified, not intractable [G44.209]       Treatment Diagnosis: Neck pain, R shoulder radiating pain    Visit Information:  Insurance: Payor: [employer]PARTMENT [employer] / Plan: [employer]MENT [employer] / Product Type: *No Product type* /   PT Visit Information  PT Insurance Information: [facility]#[id number]  Total # of Visits Approved: 12 (60 units per CPT code)  Total # of Visits to Date:   Plan of Care/Certification Expiration Date: 25  No Show: 1  Canceled Appointment: 1  Progress Note Counter: NECK:   ;  (Manual 11**/60, TE 49**/60, Estim 8**/60    Subjective Information:  Subjective: Pt reports feeling good after his last visit, notes no compliants today.  HEP Compliance:  [] Good [x] Fair [] Poor [] Reports not doing due to:    Pain Screening  Patient Currently in Pain: Yes  Pain Assessment: 0-10  Pain Level: 3  Pain Type: Acute pain  Pain Location: Neck  Pain Orientation: Right  Pain Descriptors: Tightness, Other (Comment) (tension)    Treatment:  Exercises:  Exercises  Exercise 1: UBE L4.5 F/R x5 min total  Exercise 20: HEP: continue current       Manual:   Manual Therapy  Soft Tissue Mobilizaton: STM R UT, levator scap, SCM x 8 min       *Indicates exercise, modality, or manual techniques to be initiated when appropriate    Objective Measures:      LTG 1 Current Status:: 25: Cervical flexion 60, extension 50, L SB 40 (tight) R SB 30, LR: 64 RR: 55 (tight)  LTG 2 Current Status:: 25: R shoulder flexion 5/5, Abduction 5/5, er/ir 5/5 elbow flexion and extension 5/5  LTG 3 Current Status::

## 2025-07-31 NOTE — PROGRESS NOTES
[facility]  [address] [place] [address]  Phone: [phone]    [] Certification  [] Recertification []  Plan of Care  [] Progress Note [x] Discharge      Referring Provider: Garcia Gomez DO     From:  Sheila Benitez, PT,DPT  Patient: Hunter Fay (60 y.o. male) : 1965 Date: 2025  Medical Diagnosis: Sprain of ligaments of cervical spine, initial encounter [S13.4XXA]  Sprain of ligaments of thoracic spine, initial encounter [S23.3XXA]  Tension-type headache, unspecified, not intractable [G44.209]       Treatment Diagnosis: Neck pain, R shoulder radiating pain    Plan of Care/Certification Expiration Date: 25   Progress Report Period from:  2025  to 2025    Visits to Date: 23 No Show: 1 Cancelled Appts: 1    OBJECTIVE:     Long Term Goals - Time Frame for Long Term Goals : 4 weeks  Goals Current/ Discharge status Status   Long Term Goal 1: Patient will improve cervical ROM by >/=10-15 deg to improve functional tasks such as driving. LTG 1 Current Status:: 25: Cervical flexion 60, extension 50, L SB 40 (tight) R SB 30, LR: 64 RR: 55 (tight)   Met   Long Term Goal 2: Patient will improve R UE strength to 5/5 for improve pushing / lifting tasks at work. LTG 2 Current Status:: 25: R shoulder flexion 5/5, Abduction 5/5, er/ir 5/5 elbow flexion and extension 5/5   Met   Long Term Goal 3: Patient will improve R shoulder elevation to WFL for improved work and ADL tasks. LTG 3 Current Status:: 25: Right shoulder flexion 165 degrees, 160 abduction   Met   Long Term Goal 4: Patient will report avg pain of </=3/10 during functional activities LTG 4 Current Status:: 25: Pain is normally 3/10 when completing ADLs and doing stuff at work.   Met   Long Term Goal 5: Patient will improve NDI by </= 12/50 points for improved subjective function. LTG 5 Current Status:: 25: NDI 7/50 (+5)   Met   Long term goal 6: The pt

## (undated) DEVICE — BRUSH ENDO CLN L90.5IN SHTH DIA1.7MM BRIST DIA5-7MM 2-6MM

## (undated) DEVICE — CONMED SCOPE SAVER BITE BLOCK, 20X27 MM: Brand: SCOPE SAVER

## (undated) DEVICE — TUBE SET 96 MM 64 MM H2O PERISTALTIC STD AUX CHANNEL

## (undated) DEVICE — TUBING IRRIGATION 140/160/180/190 SER GI ENDOSCP SMARTCAP

## (undated) DEVICE — TUBING, SUCTION, 1/4" X 10', STRAIGHT: Brand: MEDLINE

## (undated) DEVICE — SINGLE PORT MANIFOLD: Brand: NEPTUNE 2

## (undated) DEVICE — ENDO CARRY-ON PROCEDURE KIT: Brand: ENDO CARRY-ON PROCEDURE KIT